# Patient Record
Sex: FEMALE | Race: WHITE | NOT HISPANIC OR LATINO | Employment: FULL TIME | URBAN - NONMETROPOLITAN AREA
[De-identification: names, ages, dates, MRNs, and addresses within clinical notes are randomized per-mention and may not be internally consistent; named-entity substitution may affect disease eponyms.]

---

## 2017-02-08 ENCOUNTER — OFFICE VISIT (OUTPATIENT)
Dept: PSYCHIATRY | Facility: CLINIC | Age: 27
End: 2017-02-08

## 2017-02-08 DIAGNOSIS — F41.0 PANIC ATTACK AS REACTION TO STRESS: Primary | ICD-10-CM

## 2017-02-08 DIAGNOSIS — F43.10 POST TRAUMATIC STRESS DISORDER (PTSD): ICD-10-CM

## 2017-02-08 DIAGNOSIS — F43.0 PANIC ATTACK AS REACTION TO STRESS: Primary | ICD-10-CM

## 2017-02-08 PROBLEM — F90.0 ADHD, PREDOMINANTLY INATTENTIVE TYPE: Status: ACTIVE | Noted: 2017-02-08

## 2017-02-08 PROCEDURE — 90837 PSYTX W PT 60 MINUTES: CPT | Performed by: COUNSELOR
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
## 2017-03-07 ENCOUNTER — OFFICE VISIT (OUTPATIENT)
Dept: PSYCHIATRY | Facility: CLINIC | Age: 27
End: 2017-03-07

## 2017-03-07 VITALS
HEIGHT: 63 IN | DIASTOLIC BLOOD PRESSURE: 58 MMHG | SYSTOLIC BLOOD PRESSURE: 101 MMHG | WEIGHT: 116 LBS | HEART RATE: 84 BPM | BODY MASS INDEX: 20.55 KG/M2

## 2017-03-07 DIAGNOSIS — R56.9 SEIZURES (HCC): ICD-10-CM

## 2017-03-07 DIAGNOSIS — F41.0 PANIC ATTACK AS REACTION TO STRESS: Primary | ICD-10-CM

## 2017-03-07 DIAGNOSIS — F43.0 PANIC ATTACK AS REACTION TO STRESS: Primary | ICD-10-CM

## 2017-03-07 DIAGNOSIS — F43.10 POST TRAUMATIC STRESS DISORDER (PTSD): ICD-10-CM

## 2017-03-07 PROCEDURE — 99214 OFFICE O/P EST MOD 30 MIN: CPT | Performed by: NURSE PRACTITIONER

## 2017-03-07 RX ORDER — GUANFACINE 1 MG/1
1 TABLET ORAL NIGHTLY
Qty: 30 TABLET | Refills: 0 | Status: SHIPPED | OUTPATIENT
Start: 2017-03-07 | End: 2017-03-21 | Stop reason: SDUPTHER

## 2017-03-07 RX ORDER — LAMOTRIGINE 150 MG/1
TABLET ORAL
Refills: 10 | COMMUNITY
Start: 2017-02-15 | End: 2017-03-21 | Stop reason: SDUPTHER

## 2017-03-07 RX ORDER — FOLIC ACID 1 MG/1
1 TABLET ORAL DAILY
Refills: 10 | COMMUNITY
Start: 2017-02-15

## 2017-03-07 NOTE — PROGRESS NOTES
Subjective   Basia Schulte is a 26 y.o. female who is here today for medication management follow up.    Chief Complaint:      HPI: History of Present Illness She has been referred for medication management.  Patient has difficulty with attention and focus-she is struggling with getting her work done for college.  Patient often feels annoyance with others and has difficulty listening to others.  She often is 'rude'.  The patient reports the following symptoms of anxiety: constant anxiety/worry, restlessness/on edge, difficulty concentrating, mind goes blank, irritability, muscle tension, sleep disturbance and anxiety causes distress/impairment in important areas of functioning and have caused impairment in important areas of functioning. The patient reports the following panic symptoms: palpitations/pounding heart, trembling, sensation of shortness of breath, paresthesias and derealization/depersonalization which have collectively caused impairment in important areas of functioning. Panic symptoms usually last about 2 minutes at a time. Panic attacks are reported approximately 3 times year.  She will get things on her mind and over worry-I will think about things over 10 hours day. She denies any current depressive symptoms, she reports anxiety is currently 'ok'.  Speech is rapid.  She has to do multiple thing at a time.   The patient has had difficulty in the past with ocd behaviors including everything being in it's place.  She gets bored easily.  She is having difficulty doing class work.  She is unable to study with children in the house.  She is so easily distracted that she can't complete test if any extraneousness noise.  She frequently need to learn while active, pacing.  She has multiple symptoms of ADHD including difficulty organizing task, inattention to detail, frequent interruption into others converstation/task, difficulty complying with verbal instruction, forgetfulness, and .   Caregiver and patient  "report medication compliance.  Patient is tolerating medications without reported side effects. Patient adamantly denies any thoughts to harm self or others. Patient/caregiver report adequate sleep and adequate nutrition.  Patient appears well developed and weight is stable.  She denies any difficulty with appetite or history of eating disorders.  She denies any current ptsd, psychotic symptoms.    Medication trials with lexapro -made her symptoms worse worsened panic.  She has seizures none in last several years.      The following portions of the patient's history were reviewed and updated as appropriate: allergies, current medications, past family history, past medical history, past social history, past surgical history and problem list.    Review of Systems  Patient denies any recent medical illnesses.  No acute cardiopulmonary symptoms evident.  No acute gastrointestinal complaints.  Patient's appetite and intake are adequate.  Patient's current weight compared with last weight.    Objective   Physical Exam  Blood pressure 101/58, pulse 84, height 63\" (160 cm), weight 116 lb (52.6 kg).    No Known Allergies    Current Medications:   Current Outpatient Prescriptions   Medication Sig Dispense Refill   • folic acid (FOLVITE) 1 MG tablet Daily.  10   • lamoTRIgine (LaMICtal) 100 MG tablet Take 150 mg by mouth 2 (Two) Times a Day.       No current facility-administered medications for this visit.        Mental Status Exam:   Hygiene:   fair  Cooperation:  Cooperative  Eye Contact:  Good  Psychomotor Behavior:  Appropriate  Affect:  Full range  Hopelessness: Denies  Speech:  Normal  Thought Process:  Goal directed and Linear  Thought Content:  Mood congurent  Suicidal:  None  Homicidal:  None  Hallucinations:  None  Delusion:  None  Memory:  Intact  Orientation:  Person, Place, Time and Situation  Reliability:  fair  Insight:  Fair  Judgement:  Fair  Impulse Control:  Fair  Physical/Medical Issues:  No "     Assessment/Plan   Diagnoses and all orders for this visit:    Panic attack as reaction to stress    Post traumatic stress disorder (PTSD)    Seizures    Other orders  -     guanFACINE (TENEX) 1 MG tablet; Take 1 tablet by mouth Every Night. Take 1/2 tablet at hs for 4 days then increase to 1/2 tablet twice day.     patient appears to have multiple symptoms of anxiety and ADHD.  It is difficult to ascertain and also does appear to be medical comorbidities including active seizures may be influenced by medication choice.  We'll attempt to get records from her neurologist further assess patient we will begin with Tenex for assistance with sleep concentration and anxiety.  We will continue to explore the need for other stimulant type medications.  Encouraged patient to consider her choice of career based on her ability to tolerate extended schoolwork.  We discussed risks, benefits, and side effects of the above medication and the patient was agreeable with the plan.    Return in about 2 weeks (around 3/21/2017).  The patient was instructed to call clinic as needed or go to ER if in crisis.  Patient was instructed to immediately call 911 or come to the nearest emergency room for thoughts to harm self or others.

## 2017-03-21 ENCOUNTER — OFFICE VISIT (OUTPATIENT)
Dept: PSYCHIATRY | Facility: CLINIC | Age: 27
End: 2017-03-21

## 2017-03-21 VITALS
BODY MASS INDEX: 20.73 KG/M2 | WEIGHT: 117 LBS | SYSTOLIC BLOOD PRESSURE: 98 MMHG | HEIGHT: 63 IN | DIASTOLIC BLOOD PRESSURE: 62 MMHG | HEART RATE: 92 BPM

## 2017-03-21 DIAGNOSIS — R56.9 SEIZURES (HCC): ICD-10-CM

## 2017-03-21 DIAGNOSIS — F41.0 PANIC ATTACK AS REACTION TO STRESS: ICD-10-CM

## 2017-03-21 DIAGNOSIS — F90.2 ADHD (ATTENTION DEFICIT HYPERACTIVITY DISORDER), COMBINED TYPE: Primary | ICD-10-CM

## 2017-03-21 DIAGNOSIS — F43.0 PANIC ATTACK AS REACTION TO STRESS: ICD-10-CM

## 2017-03-21 DIAGNOSIS — F43.10 POST TRAUMATIC STRESS DISORDER (PTSD): ICD-10-CM

## 2017-03-21 PROCEDURE — 99213 OFFICE O/P EST LOW 20 MIN: CPT | Performed by: NURSE PRACTITIONER

## 2017-03-21 RX ORDER — GUANFACINE 1 MG/1
1 TABLET ORAL NIGHTLY
Qty: 30 TABLET | Refills: 0 | Status: SHIPPED | OUTPATIENT
Start: 2017-03-21 | End: 2017-04-11 | Stop reason: SDUPTHER

## 2017-03-21 RX ORDER — METHYLPHENIDATE HYDROCHLORIDE 18 MG/1
18 TABLET ORAL DAILY
Qty: 30 TABLET | Refills: 0 | Status: SHIPPED | OUTPATIENT
Start: 2017-03-21 | End: 2017-04-11 | Stop reason: SDUPTHER

## 2017-03-21 NOTE — PROGRESS NOTES
"Subjective   Basia Schulte is a 26 y.o. female who is here today for medication management follow up.    Chief Complaint: That medicine helped a lot I'm not nearly as anxious    HPI: History of Present Illness patient presents today for follow-up after being started on Tenex for her significant attention problems.  The patient does report much improvement in her sleep she also reports improvement in her anxiety.  Patient continues to have great difficulty with attention and focus she is frequently losing things she frequently is unable to complete her assignments she continues to have to have the patience by her instructors.  Patient is reviewed on her previous records which demonstrates that she has a seizure disorder but it is well under control.  I previously consulted with my collaborating physician and ARNP that frequently treats attention deficit hyperactivity disorder regarding comorbid seizures patient was instructed at last visit that if necessary stimulants may increase her risk of seizures she is agreeable to trial.  Patient adamantly denies any mood symptoms she denies any thoughts to harm herself or others she denies any psychotic phenomenon she denies any other medical problems other than controlled seizures.    The following portions of the patient's history were reviewed and updated as appropriate: allergies, current medications, past family history, past medical history, past social history, past surgical history and problem list.    Review of Systems  Patient denies any recent medical illnesses.  No acute cardiopulmonary symptoms evident.  No acute gastrointestinal complaints.  Patient's appetite and intake are adequate.  Patient's current weight compared with last weight.    Objective   Physical Exam  Blood pressure 98/62, pulse 92, height 63\" (160 cm), weight 117 lb (53.1 kg).    No Known Allergies    Current Medications:   Current Outpatient Prescriptions   Medication Sig Dispense Refill   • " folic acid (FOLVITE) 1 MG tablet Daily.  10   • guanFACINE (TENEX) 1 MG tablet Take 1 tablet by mouth Every Night. Take 1/2 tablet at hs for 4 days then increase to 1/2 tablet twice day. 30 tablet 0   • lamoTRIgine (LaMICtal) 100 MG tablet Take 150 mg by mouth 2 (Two) Times a Day.     • methylphenidate (CONCERTA) 18 MG CR tablet Take 1 tablet by mouth Daily. 30 tablet 0     No current facility-administered medications for this visit.        Mental Status Exam:   Hygiene:   fair  Cooperation:  Cooperative  Eye Contact:  Good  Psychomotor Behavior:  Appropriate  Affect:  Full range  Hopelessness: Denies  Speech:  Normal  Thought Process:  Goal directed and Linear   Thought Content:  Normal and Mood congurent  Suicidal:  None  Homicidal:  None  Hallucinations:  None  Delusion:  None  Memory:  Intact  Orientation:  Person, Place, Time and Situation  Reliability:  fair  Insight:  Fair  Judgement:  Fair  Impulse Control:  Fair  Physical/Medical Issues:  No     Assessment/Plan   Diagnoses and all orders for this visit:    ADHD (attention deficit hyperactivity disorder), combined type    Panic attack as reaction to stress    Post traumatic stress disorder (PTSD)    Seizures    Other orders  -     methylphenidate (CONCERTA) 18 MG CR tablet; Take 1 tablet by mouth Daily.  -     guanFACINE (TENEX) 1 MG tablet; Take 1 tablet by mouth Every Night. Take 1/2 tablet at hs for 4 days then increase to 1/2 tablet twice day.        Patient had been currently fully assessed and does appear to meet his stimulants to assist her patient will be started on low-dose extended release Concerta we completed an extensive side effect education.  Patient was provided education regarding controlled substances contract was signed.  Patient consistently and adamantly denies any substance related problems.  We discussed risks, benefits, and side effects of the above medication and the patient was agreeable with the plan.    Return in about 4 weeks  (around 4/18/2017).  The patient was instructed to call clinic as needed or go to ER if in crisis.  Patient was instructed to immediately call 911 or come to the nearest emergency room for thoughts to harm self or others.

## 2017-04-11 ENCOUNTER — OFFICE VISIT (OUTPATIENT)
Dept: PSYCHIATRY | Facility: CLINIC | Age: 27
End: 2017-04-11

## 2017-04-11 VITALS
HEART RATE: 90 BPM | DIASTOLIC BLOOD PRESSURE: 60 MMHG | WEIGHT: 112 LBS | BODY MASS INDEX: 19.84 KG/M2 | HEIGHT: 63 IN | SYSTOLIC BLOOD PRESSURE: 93 MMHG

## 2017-04-11 DIAGNOSIS — F41.0 PANIC ATTACK AS REACTION TO STRESS: ICD-10-CM

## 2017-04-11 DIAGNOSIS — F43.0 PANIC ATTACK AS REACTION TO STRESS: ICD-10-CM

## 2017-04-11 DIAGNOSIS — F43.10 POST TRAUMATIC STRESS DISORDER (PTSD): ICD-10-CM

## 2017-04-11 DIAGNOSIS — F90.2 ADHD (ATTENTION DEFICIT HYPERACTIVITY DISORDER), COMBINED TYPE: Primary | ICD-10-CM

## 2017-04-11 DIAGNOSIS — R56.9 SEIZURES (HCC): ICD-10-CM

## 2017-04-11 PROCEDURE — 99213 OFFICE O/P EST LOW 20 MIN: CPT | Performed by: NURSE PRACTITIONER

## 2017-04-11 RX ORDER — METHYLPHENIDATE HYDROCHLORIDE 18 MG/1
18 TABLET ORAL DAILY
Qty: 30 TABLET | Refills: 0 | Status: SHIPPED | OUTPATIENT
Start: 2017-04-11 | End: 2017-08-01 | Stop reason: SDDI

## 2017-04-11 RX ORDER — GUANFACINE 1 MG/1
0.5 TABLET ORAL NIGHTLY
Qty: 30 TABLET | Refills: 0 | Status: SHIPPED | OUTPATIENT
Start: 2017-04-11 | End: 2017-08-01 | Stop reason: SDDI

## 2017-04-11 NOTE — PROGRESS NOTES
"Subjective   Basia Schulte is a 27 y.o. female who is here today for medication management follow up.    Chief Complaint: That medicine helped a lot I'm not nearly as anxious    HPI: History of Present Illness  The patient presents today for follow-up after being started on concerta and Tenex for her significant attention problems.  The patient does report much improvement in her sleep and also has been much improved at school.  \"I made an 89 on a test highest in the class.  She reports \"I'm so much better, I can't believe it\".  She also reports improvement in her anxiety.  Patient reports much improvement with attention and focus she has been able to complete her assignments.  She denies any mood symptoms, thoughts of self harm or suicide.  Patient is denying any  Seizures.  The patient was instructed on medication holidays.  Patient adamantly denies any mood symptoms she denies any thoughts to harm herself or others she denies any psychotic phenomenon.       The following portions of the patient's history were reviewed and updated as appropriate: allergies, current medications, past family history, past medical history, past social history, past surgical history and problem list.    Review of Systems  Patient denies any recent medical illnesses.  No acute cardiopulmonary symptoms evident.  No acute gastrointestinal complaints.  Patient's appetite and intake are adequate.  Patient's current weight compared with last weight.    Objective   Physical Exam  Blood pressure 93/60, pulse 90, height 63\" (160 cm), weight 112 lb (50.8 kg).    No Known Allergies    Current Medications:   Current Outpatient Prescriptions   Medication Sig Dispense Refill   • folic acid (FOLVITE) 1 MG tablet Daily.  10   • guanFACINE (TENEX) 1 MG tablet Take 1 tablet by mouth Every Night. Take 1/2 tablet at hs for 4 days then increase to 1/2 tablet twice day. 30 tablet 0   • lamoTRIgine (LaMICtal) 100 MG tablet Take 150 mg by mouth 2 (Two) Times " a Day.     • methylphenidate (CONCERTA) 18 MG CR tablet Take 1 tablet by mouth Daily. 30 tablet 0     No current facility-administered medications for this visit.        Mental Status Exam:   Hygiene:   fair  Cooperation:  Cooperative  Eye Contact:  Good  Psychomotor Behavior:  Appropriate  Affect:  Full range  Hopelessness: Denies  Speech:  Normal  Thought Process:  Goal directed and Linear   Thought Content:  Normal and Mood congurent  Suicidal:  None  Homicidal:  None  Hallucinations:  None  Delusion:  None  Memory:  Intact  Orientation:  Person, Place, Time and Situation  Reliability:  fair  Insight:  Fair  Judgement:  Fair  Impulse Control:  Fair  Physical/Medical Issues:  No     Assessment/Plan   Diagnoses and all orders for this visit:    ADHD (attention deficit hyperactivity disorder), combined type    Panic attack as reaction to stress    Post traumatic stress disorder (PTSD)    Seizures    Other orders  -     guanFACINE (TENEX) 1 MG tablet; Take 0.5 tablets by mouth Every Night.  -     methylphenidate (CONCERTA) 18 MG CR tablet; Take 1 tablet by mouth Daily.        Patient had been currently fully assessed and does appear to meet his stimulants to assist her patient will be started on low-dose extended release Concerta we completed an extensive side effect education.  Patient was provided education regarding controlled substances contract was signed.  Patient consistently and adamantly denies any substance related problems.  We discussed risks, benefits, and side effects of the above medication and the patient was agreeable with the plan.    Return in about 4 weeks (around 5/9/2017).  The patient was instructed to call clinic as needed or go to ER if in crisis.  Patient was instructed to immediately call 911 or come to the nearest emergency room for thoughts to harm self or others.

## 2017-08-01 ENCOUNTER — OFFICE VISIT (OUTPATIENT)
Dept: PSYCHIATRY | Facility: CLINIC | Age: 27
End: 2017-08-01

## 2017-08-01 VITALS
HEART RATE: 96 BPM | WEIGHT: 116 LBS | SYSTOLIC BLOOD PRESSURE: 99 MMHG | DIASTOLIC BLOOD PRESSURE: 71 MMHG | HEIGHT: 63 IN | BODY MASS INDEX: 20.55 KG/M2

## 2017-08-01 DIAGNOSIS — F43.0 PANIC ATTACK AS REACTION TO STRESS: ICD-10-CM

## 2017-08-01 DIAGNOSIS — F41.0 PANIC ATTACK AS REACTION TO STRESS: ICD-10-CM

## 2017-08-01 DIAGNOSIS — R56.9 SEIZURES (HCC): ICD-10-CM

## 2017-08-01 DIAGNOSIS — F90.2 ADHD (ATTENTION DEFICIT HYPERACTIVITY DISORDER), COMBINED TYPE: Primary | ICD-10-CM

## 2017-08-01 DIAGNOSIS — F43.10 POST TRAUMATIC STRESS DISORDER (PTSD): ICD-10-CM

## 2017-08-01 PROCEDURE — 99213 OFFICE O/P EST LOW 20 MIN: CPT | Performed by: PSYCHIATRY & NEUROLOGY

## 2017-08-01 RX ORDER — BROMPHENIRAMINE MALEATE, PSEUDOEPHEDRINE HYDROCHLORIDE, AND DEXTROMETHORPHAN HYDROBROMIDE 2; 30; 10 MG/5ML; MG/5ML; MG/5ML
SYRUP ORAL
Refills: 0 | COMMUNITY
Start: 2017-06-28 | End: 2017-08-01

## 2017-08-01 RX ORDER — METHYLPHENIDATE HYDROCHLORIDE 18 MG/1
18 TABLET ORAL DAILY
Qty: 30 TABLET | Refills: 0 | Status: SHIPPED | OUTPATIENT
Start: 2017-08-01 | End: 2017-08-29 | Stop reason: SDUPTHER

## 2017-08-01 RX ORDER — LAMOTRIGINE 150 MG/1
150 TABLET ORAL 2 TIMES DAILY
Refills: 10 | COMMUNITY
Start: 2017-07-14 | End: 2019-02-06 | Stop reason: SDUPTHER

## 2017-08-01 RX ORDER — GUANFACINE 1 MG/1
0.5 TABLET ORAL NIGHTLY
Qty: 30 TABLET | Refills: 0 | Status: SHIPPED | OUTPATIENT
Start: 2017-08-01 | End: 2017-08-29 | Stop reason: SDUPTHER

## 2017-08-01 NOTE — PROGRESS NOTES
"Subjective   Patient ID: Basia Schulte is a 27 y.o. female is here today for follow-up.     BP 99/71  Pulse 96  Ht 63\" (160 cm)  Wt 116 lb (52.6 kg)  BMI 20.55 kg/m2    Review of patient's allergies indicates no known allergies.    History of Present Illness The patient is seen in the clinic for a follow-up. She has been seeing Nancy Tipton for her ADHD treatment and has not been in the clinic for the last three months. She states that she was off for the summer and didn't take her medications but now she is starting school, she wants to resume her medication. She states that the medication helps her be less fidgety and she is able to focus better. She reports her mood to be good and denies any thoughts of harm to self or others.    The following portions of the patient's history were reviewed and updated as appropriate: allergies, current medications, past family history, past medical history, past social history, past surgical history and problem list.    PFSH:    Review of Systems   Constitutional: Negative.    HENT: Negative.    Eyes: Negative.    Respiratory: Negative.    Cardiovascular: Negative.    Gastrointestinal: Positive for nausea.       Objective   Mental Status Exam  Appearance:  clean and casually dressed, appropriate  Attitude toward clinician:  cooperative and agreeable   Speech:    Rate:  regular rate and rhythm   Volume:  normal  Motor:  no abnormal movements present  Mood:  Good  Affect:  euthymic  Thought Processes:  linear, logical, and goal directed  Thought Content:  normal  Suicidal Thoughts:  absent  Homicidal Thoughts:  absent  Perceptual Disturbance: no perceptual disturbance  Attention and Concentration:  good  Insight and Judgement:  good  Memory:  memory appears to be intact    MEDICATION ISSUES:  Current Outpatient Prescriptions on File Prior to Visit   Medication Sig Dispense Refill   • folic acid (FOLVITE) 1 MG tablet Daily.  10   • [DISCONTINUED] guanFACINE (TENEX) 1 MG tablet " Take 0.5 tablets by mouth Every Night. 30 tablet 0   • [DISCONTINUED] lamoTRIgine (LaMICtal) 100 MG tablet Take 150 mg by mouth 2 (Two) Times a Day.     • [DISCONTINUED] methylphenidate (CONCERTA) 18 MG CR tablet Take 1 tablet by mouth Daily. 30 tablet 0     No current facility-administered medications on file prior to visit.        Lab Review:   No visits with results within 2 Month(s) from this visit.  Latest known visit with results is:    Admission on 10/30/2016, Discharged on 10/30/2016   Component Date Value   • Glucose 10/30/2016 98    • BUN 10/30/2016 9    • Creatinine 10/30/2016 0.79    • Sodium 10/30/2016 141    • Potassium 10/30/2016 3.6    • Chloride 10/30/2016 111    • CO2 10/30/2016 29.3    • Calcium 10/30/2016 9.4    • Total Protein 10/30/2016 7.3    • Albumin 10/30/2016 4.40    • ALT (SGPT) 10/30/2016 10    • AST (SGOT) 10/30/2016 18    • Alkaline Phosphatase 10/30/2016 69    • Total Bilirubin 10/30/2016 0.5    • eGFR Non African Amer 10/30/2016 89    • Globulin 10/30/2016 2.9    • A/G Ratio 10/30/2016 1.5    • BUN/Creatinine Ratio 10/30/2016 11.4    • Anion Gap 10/30/2016 0.7*   • HCG, Urine QL 10/30/2016 Negative    • Color, UA 10/30/2016 Yellow    • Appearance, UA 10/30/2016 Clear    • pH, UA 10/30/2016 7.5    • Specific Gravity, UA 10/30/2016 1.007    • Glucose, UA 10/30/2016 Negative    • Ketones, UA 10/30/2016 Negative    • Bilirubin, UA 10/30/2016 Negative    • Blood, UA 10/30/2016 Trace*   • Protein, UA 10/30/2016 Negative    • Leuk Esterase, UA 10/30/2016 Trace*   • Nitrite, UA 10/30/2016 Negative    • Urobilinogen, UA 10/30/2016 0.2 E.U./dL    • WBC 10/30/2016 6.85    • RBC 10/30/2016 4.33    • Hemoglobin 10/30/2016 13.9    • Hematocrit 10/30/2016 41.6    • MCV 10/30/2016 96.1*   • MCH 10/30/2016 32.1    • MCHC 10/30/2016 33.4    • RDW 10/30/2016 12.2    • RDW-SD 10/30/2016 43.3    • MPV 10/30/2016 11.4*   • Platelets 10/30/2016 230    • Neutrophil % 10/30/2016 69.7    • Lymphocyte %  10/30/2016 21.9    • Monocyte % 10/30/2016 7.6    • Eosinophil % 10/30/2016 0.4    • Basophil % 10/30/2016 0.3    • Immature Grans % 10/30/2016 0.1    • Neutrophils, Absolute 10/30/2016 4.77    • Lymphocytes, Absolute 10/30/2016 1.50    • Monocytes, Absolute 10/30/2016 0.52    • Eosinophils, Absolute 10/30/2016 0.03    • Basophils, Absolute 10/30/2016 0.02    • Immature Grans, Absolute 10/30/2016 0.01    • Osmolality Calc 10/30/2016 280    • RBC, UA 10/30/2016 0-2*   • WBC, UA 10/30/2016 0-2*   • Bacteria, UA 10/30/2016 None Seen    • Squamous Epithelial Cell* 10/30/2016 0-2    • Hyaline Casts, UA 10/30/2016 None Seen    • Methodology 10/30/2016 Automated Microscopy      Assessment/Plan   Diagnoses and all orders for this visit:    ADHD (attention deficit hyperactivity disorder), combined type    Panic attack as reaction to stress    Post traumatic stress disorder (PTSD)    Seizures    Other orders  -     methylphenidate (CONCERTA) 18 MG CR tablet; Take 1 tablet by mouth Daily.  -     guanFACINE (TENEX) 1 MG tablet; Take 0.5 tablets by mouth Every Night.      Return in about 4 weeks (around 8/29/2017). The patient will resume her care with Nancy Tipton.

## 2017-08-06 PROBLEM — Z01.419 WELL WOMAN EXAM: Status: ACTIVE | Noted: 2017-08-06

## 2017-08-11 ENCOUNTER — OFFICE VISIT (OUTPATIENT)
Dept: OBSTETRICS AND GYNECOLOGY | Facility: CLINIC | Age: 27
End: 2017-08-11

## 2017-08-11 VITALS
SYSTOLIC BLOOD PRESSURE: 106 MMHG | BODY MASS INDEX: 20.91 KG/M2 | HEIGHT: 63 IN | DIASTOLIC BLOOD PRESSURE: 68 MMHG | RESPIRATION RATE: 14 BRPM | WEIGHT: 118 LBS

## 2017-08-11 DIAGNOSIS — Z01.419 WELL WOMAN EXAM: Primary | ICD-10-CM

## 2017-08-11 PROBLEM — F17.200 SMOKER: Status: ACTIVE | Noted: 2017-08-11

## 2017-08-11 PROCEDURE — 99395 PREV VISIT EST AGE 18-39: CPT | Performed by: OBSTETRICS & GYNECOLOGY

## 2017-08-11 RX ORDER — MEDROXYPROGESTERONE ACETATE 150 MG/ML
150 INJECTION, SUSPENSION INTRAMUSCULAR
Qty: 1 EACH | Refills: 4 | Status: SHIPPED | OUTPATIENT
Start: 2017-08-11 | End: 2018-06-14
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
## 2017-08-29 ENCOUNTER — OFFICE VISIT (OUTPATIENT)
Dept: PSYCHIATRY | Facility: CLINIC | Age: 27
End: 2017-08-29

## 2017-08-29 VITALS
DIASTOLIC BLOOD PRESSURE: 58 MMHG | WEIGHT: 117 LBS | SYSTOLIC BLOOD PRESSURE: 86 MMHG | HEIGHT: 63 IN | HEART RATE: 68 BPM | BODY MASS INDEX: 20.73 KG/M2

## 2017-08-29 DIAGNOSIS — F90.2 ADHD (ATTENTION DEFICIT HYPERACTIVITY DISORDER), COMBINED TYPE: Primary | ICD-10-CM

## 2017-08-29 DIAGNOSIS — R56.9 SEIZURES (HCC): ICD-10-CM

## 2017-08-29 PROCEDURE — 99213 OFFICE O/P EST LOW 20 MIN: CPT | Performed by: NURSE PRACTITIONER

## 2017-08-29 RX ORDER — METHYLPHENIDATE HYDROCHLORIDE 18 MG/1
18 TABLET ORAL DAILY
Qty: 30 TABLET | Refills: 0 | Status: SHIPPED | OUTPATIENT
Start: 2017-08-29 | End: 2017-10-02 | Stop reason: SDUPTHER

## 2017-08-29 RX ORDER — GUANFACINE 1 MG/1
0.5 TABLET ORAL NIGHTLY
Qty: 30 TABLET | Refills: 0 | Status: SHIPPED | OUTPATIENT
Start: 2017-08-29 | End: 2017-10-02 | Stop reason: SDUPTHER

## 2017-08-29 NOTE — PROGRESS NOTES
"Subjective   Basia Schulte is a 27 y.o. female who is here today for medication management follow up.    Chief Complaint:I've restarted on the medication for school.    HPI: History of Present Illness  The patient presents today for follow-up after being off concerta and Tenex for summer. The patient does report much improvement in her sleep and also has been much improved at school.  She did well with school last year.    She also reports improvement in her anxiety.  Patient reports much improvement with attention and focus she has been able to complete her assignments.  She denies any mood symptoms, thoughts of self harm or suicide.  Patient is denying any  Seizures.  Patient adamantly denies any mood symptoms she denies any thoughts to harm herself or others she denies any psychotic phenomenon.  She did report much worsening of distraction and attention over summer.     The following portions of the patient's history were reviewed and updated as appropriate: allergies, current medications, past family history, past medical history, past social history, past surgical history and problem list.    Review of Systems  Patient denies any recent medical illnesses.  No acute cardiopulmonary symptoms evident.  No acute gastrointestinal complaints.  Patient's appetite and intake are adequate.  Patient's current weight compared with last weight.    Objective   Physical Exam  Blood pressure (!) 86/58, pulse 68, height 63\" (160 cm), weight 117 lb (53.1 kg), last menstrual period 08/03/2017, not currently breastfeeding.    No Known Allergies    Current Medications:   Current Outpatient Prescriptions   Medication Sig Dispense Refill   • folic acid (FOLVITE) 1 MG tablet Daily.  10   • guanFACINE (TENEX) 1 MG tablet Take 0.5 tablets by mouth Every Night. 30 tablet 0   • lamoTRIgine (LaMICtal) 150 MG tablet 150 mg 2 (Two) Times a Day.  10   • medroxyPROGESTERone (DEPO-PROVERA) 150 MG/ML injection Inject 1 mL into the shoulder, " thigh, or buttocks Every 3 (Three) Months. 1 each 4   • methylphenidate (CONCERTA) 18 MG CR tablet Take 1 tablet by mouth Daily. 30 tablet 0     No current facility-administered medications for this visit.        Mental Status Exam:   Hygiene:   fair  Cooperation:  Cooperative  Eye Contact:  Good  Psychomotor Behavior:  Appropriate  Affect:  Full range  Hopelessness: Denies  Speech:  Normal  Thought Process:  Goal directed and Linear   Thought Content:  Normal and Mood congurent  Suicidal:  None  Homicidal:  None  Hallucinations:  None  Delusion:  None  Memory:  Intact  Orientation:  Person, Place, Time and Situation  Reliability:  fair  Insight:  Fair  Judgement:  Fair  Impulse Control:  Fair  Physical/Medical Issues:  No     Assessment/Plan   Diagnoses and all orders for this visit:    ADHD (attention deficit hyperactivity disorder), combined type    Seizures    Errors in dictation may reflect use of voice recognition software and not all errors in transcription may have been detected prior to signing.Patient is being prescribed a controlled substance as part of treatment plan. Patient has been educated of appropriate use of the medications, including risk of somnolence, limited ability to drive and/or work safely, and potential for dependence, respiratory depression and overdose. Patient is also informed that the medication are to be used by the patient only- avoid any combined use of ETOH or other substances unless prescribed.         Patient had been currently fully assessed and does appear to meet his stimulants to assist her patient will be started on low-dose extended release Concerta we completed an extensive side effect education.  Patient was provided education regarding controlled substances contract was signed.  Patient consistently and adamantly denies any substance related problems.  We discussed risks, benefits, and side effects of the above medication and the patient was agreeable with the  plan.    Return in about 4 weeks (around 9/26/2017).  The patient was instructed to call clinic as needed or go to ER if in crisis.  Patient was instructed to immediately call 911 or come to the nearest emergency room for thoughts to harm self or others.

## 2017-10-02 RX ORDER — METHYLPHENIDATE HYDROCHLORIDE 18 MG/1
18 TABLET ORAL DAILY
Qty: 30 TABLET | Refills: 0 | Status: SHIPPED | OUTPATIENT
Start: 2017-10-02 | End: 2017-11-02 | Stop reason: SDUPTHER

## 2017-10-02 RX ORDER — GUANFACINE 1 MG/1
0.5 TABLET ORAL NIGHTLY
Qty: 30 TABLET | Refills: 0 | Status: SHIPPED | OUTPATIENT
Start: 2017-10-02 | End: 2017-11-30 | Stop reason: SDUPTHER

## 2017-11-02 ENCOUNTER — OFFICE VISIT (OUTPATIENT)
Dept: PSYCHIATRY | Facility: CLINIC | Age: 27
End: 2017-11-02

## 2017-11-02 VITALS
WEIGHT: 118 LBS | DIASTOLIC BLOOD PRESSURE: 57 MMHG | SYSTOLIC BLOOD PRESSURE: 94 MMHG | HEART RATE: 85 BPM | BODY MASS INDEX: 20.91 KG/M2 | HEIGHT: 63 IN

## 2017-11-02 DIAGNOSIS — F41.0 PANIC ATTACK AS REACTION TO STRESS: ICD-10-CM

## 2017-11-02 DIAGNOSIS — F43.10 POST TRAUMATIC STRESS DISORDER (PTSD): ICD-10-CM

## 2017-11-02 DIAGNOSIS — F43.0 PANIC ATTACK AS REACTION TO STRESS: ICD-10-CM

## 2017-11-02 DIAGNOSIS — F90.2 ADHD (ATTENTION DEFICIT HYPERACTIVITY DISORDER), COMBINED TYPE: Primary | ICD-10-CM

## 2017-11-02 PROCEDURE — 99213 OFFICE O/P EST LOW 20 MIN: CPT | Performed by: NURSE PRACTITIONER

## 2017-11-02 RX ORDER — METHYLPHENIDATE HYDROCHLORIDE 18 MG/1
18 TABLET ORAL DAILY
Qty: 30 TABLET | Refills: 0 | Status: SHIPPED | OUTPATIENT
Start: 2017-11-02 | End: 2017-11-30 | Stop reason: SDUPTHER
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
## 2017-11-30 RX ORDER — GUANFACINE 1 MG/1
0.5 TABLET ORAL NIGHTLY
Qty: 30 TABLET | Refills: 0 | Status: SHIPPED | OUTPATIENT
Start: 2017-11-30 | End: 2018-01-29 | Stop reason: SDUPTHER

## 2017-11-30 RX ORDER — METHYLPHENIDATE HYDROCHLORIDE 18 MG/1
18 TABLET ORAL DAILY
Qty: 30 TABLET | Refills: 0 | Status: SHIPPED | OUTPATIENT
Start: 2017-11-30 | End: 2018-01-04 | Stop reason: SDUPTHER

## 2018-01-04 RX ORDER — METHYLPHENIDATE HYDROCHLORIDE 18 MG/1
18 TABLET ORAL DAILY
Qty: 30 TABLET | Refills: 0 | Status: SHIPPED | OUTPATIENT
Start: 2018-01-04 | End: 2018-01-29 | Stop reason: SDUPTHER

## 2018-01-29 ENCOUNTER — OFFICE VISIT (OUTPATIENT)
Dept: PSYCHIATRY | Facility: CLINIC | Age: 28
End: 2018-01-29

## 2018-01-29 VITALS
WEIGHT: 116 LBS | HEIGHT: 63 IN | DIASTOLIC BLOOD PRESSURE: 67 MMHG | HEART RATE: 68 BPM | BODY MASS INDEX: 20.55 KG/M2 | SYSTOLIC BLOOD PRESSURE: 98 MMHG

## 2018-01-29 DIAGNOSIS — F41.0 PANIC ATTACK AS REACTION TO STRESS: ICD-10-CM

## 2018-01-29 DIAGNOSIS — F90.2 ADHD (ATTENTION DEFICIT HYPERACTIVITY DISORDER), COMBINED TYPE: Primary | ICD-10-CM

## 2018-01-29 DIAGNOSIS — G40.909 SEIZURE DISORDER (HCC): ICD-10-CM

## 2018-01-29 DIAGNOSIS — Z79.899 MEDICATION MANAGEMENT: ICD-10-CM

## 2018-01-29 DIAGNOSIS — F43.0 PANIC ATTACK AS REACTION TO STRESS: ICD-10-CM

## 2018-01-29 DIAGNOSIS — F43.10 POST TRAUMATIC STRESS DISORDER (PTSD): ICD-10-CM

## 2018-01-29 LAB
AMPHETAMINE CUT-OFF: 1000
BENZODIAZIPINE CUT-OFF: 300
BUPRENORPHINE CUT-OFF: 10
COCAINE CUT-OFF: 300
EXTERNAL AMPHETAMINE SCREEN URINE: NEGATIVE
EXTERNAL BENZODIAZEPINE SCREEN URINE: NEGATIVE
EXTERNAL BUPRENORPHINE SCREEN URINE: NEGATIVE
EXTERNAL COCAINE SCREEN URINE: NEGATIVE
EXTERNAL MDMA: NEGATIVE
EXTERNAL METHADONE SCREEN URINE: NEGATIVE
EXTERNAL METHAMPHETAMINE SCREEN URINE: NEGATIVE
EXTERNAL OPIATES SCREEN URINE: NEGATIVE
EXTERNAL OXYCODONE SCREEN URINE: NEGATIVE
EXTERNAL THC SCREEN URINE: NEGATIVE
MDMA CUT-OFF: 500
METHADONE CUT-OFF: 300
METHAMPHETAMINE CUT-OFF: 1000
OPIATES CUT-OFF: 300
OXYCODONE CUT-OFF: 100
THC CUT-OFF: 50

## 2018-01-29 PROCEDURE — 99213 OFFICE O/P EST LOW 20 MIN: CPT | Performed by: NURSE PRACTITIONER

## 2018-01-29 RX ORDER — METHYLPHENIDATE HYDROCHLORIDE 18 MG/1
18 TABLET ORAL DAILY
Qty: 30 TABLET | Refills: 0 | Status: SHIPPED | OUTPATIENT
Start: 2018-01-29 | End: 2018-02-26 | Stop reason: SDUPTHER

## 2018-01-29 RX ORDER — GUANFACINE 1 MG/1
0.5 TABLET ORAL NIGHTLY
Qty: 30 TABLET | Refills: 0 | Status: SHIPPED | OUTPATIENT
Start: 2018-01-29 | End: 2018-02-26 | Stop reason: SDUPTHER

## 2018-01-29 NOTE — PROGRESS NOTES
"Subjective   Basia Schulte is a 27 y.o. female who is here today for medication management follow up.    Chief Complaint:I've restarted on the medication for school.    HPI: History of Present Illness  The patient presents today for follow-up after resuming concerta.    She also reports worsening of her anxiety.  Patient reports much improvement with attention and focus she has been able to complete her assignments.  She denies any mood symptoms, thoughts of self harm or suicide.  Patient is denying any  Seizures.  Patient adamantly denies any mood symptoms she denies any thoughts to harm herself or others she denies any psychotic phenomenon.    She did share that her  grandmother passed away who partially raised her she has been the executor of the 's has caused her a great deal of stress and concern.  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  She is also assumed custody of her niece she states that it has been extremely overwhelming.  She reports being very stressed.       The following portions of the patient's history were reviewed and updated as appropriate: allergies, current medications, past family history, past medical history, past social history, past surgical history and problem list.    Review of Systems  Patient denies any recent medical illnesses.  No acute cardiopulmonary symptoms evident.  No acute gastrointestinal complaints.  Patient's appetite and intake are adequate.  Patient's current weight compared with last weight.    Objective   Physical Exam  Blood pressure 98/67, pulse 68, height 160 cm (62.99\"), weight 52.6 kg (116 lb), not currently breastfeeding.    No Known Allergies    Current Medications:   Current Outpatient Prescriptions   Medication Sig Dispense Refill   • folic acid (FOLVITE) 1 MG tablet Daily.  10   • guanFACINE (TENEX) 1 MG tablet Take 0.5 tablets by mouth Every Night. 30 tablet 0   • lamoTRIgine (LaMICtal) 150 MG tablet " 150 mg 2 (Two) Times a Day.  10   • medroxyPROGESTERone (DEPO-PROVERA) 150 MG/ML injection Inject 1 mL into the shoulder, thigh, or buttocks Every 3 (Three) Months. 1 each 4   • methylphenidate (CONCERTA) 18 MG CR tablet Take 1 tablet by mouth Daily Earliest Fill Date: 1/4/18 30 tablet 0     No current facility-administered medications for this visit.        Mental Status Exam:   Hygiene:   fair  Cooperation:  Cooperative  Eye Contact:  Good  Psychomotor Behavior:  Appropriate  Affect:  Full range  Hopelessness: Denies  Speech:  Normal  Thought Process:  Goal directed and Linear   Thought Content:  Normal and Mood congurent  Suicidal:  None  Homicidal:  None  Hallucinations:  None  Delusion:  None  Memory:  Intact  Orientation:  Person, Place, Time and Situation  Reliability:  fair  Insight:  Fair  Judgement:  Fair  Impulse Control:  Fair  Physical/Medical Issues:  No     Assessment/Plan   Diagnoses and all orders for this visit:    ADHD (attention deficit hyperactivity disorder), combined type    Panic attack as reaction to stress    Post traumatic stress disorder (PTSD)    Seizure disorder    Medication management  -     [facility] Drug Screen    Other orders  -     guanFACINE (TENEX) 1 MG tablet; Take 0.5 tablets by mouth Every Night.  -     methylphenidate (CONCERTA) 18 MG CR tablet; Take 1 tablet by mouth Daily    Errors in dictation may reflect use of voice recognition software and not all errors in transcription may have been detected prior to signing.Patient is being prescribed a controlled substance as part of treatment plan. Patient has been educated of appropriate use of the medications, including risk of somnolence, limited ability to drive and/or work safely, and potential for dependence, respiratory depression and overdose. Patient is also informed that the medication are to be used by the patient only- avoid any combined use of ETOH or other substances unless p      Kyle report of past 12 months  reviewed with no new issues- . Patient reports taking medication as prescribed.  Patient denies any abuse/misuse of medication.  Patient denies any other substance use issues.  No apparent substance related issues.  Patient appropriate to continue with medication.  Reinforced risk of medication.  Patient had been currently fully assessed and does appear to meet his stimulants to assist her patient will be started on low-dose extended release Concerta we completed an extensive side effect education.  Patient was provided education regarding controlled substances contract was signed.  Patient consistently and adamantly denies any substance related problems.  We discussed risks, benefits, and side effects of the above medication and the patient was agreeable with the plan.    Return in about 4 weeks (around 2/26/2018).  The patient was instructed to call clinic as needed or go to ER if in crisis.  Patient was instructed to immediately call 911 or come to the nearest emergency room for thoughts to harm self or others.

## 2018-02-10 ENCOUNTER — HOSPITAL ENCOUNTER (EMERGENCY)
Facility: HOSPITAL | Age: 28
Discharge: HOME | End: 2018-02-10
Attending: EMERGENCY MEDICINE | Admitting: EMERGENCY MEDICINE

## 2018-02-10 VITALS
SYSTOLIC BLOOD PRESSURE: 104 MMHG | HEIGHT: 63 IN | DIASTOLIC BLOOD PRESSURE: 75 MMHG | WEIGHT: 118 LBS | RESPIRATION RATE: 19 BRPM | TEMPERATURE: 98.4 F | BODY MASS INDEX: 20.91 KG/M2 | HEART RATE: 75 BPM | OXYGEN SATURATION: 99 %

## 2018-02-10 DIAGNOSIS — L02.31 LEFT BUTTOCK ABSCESS: Primary | ICD-10-CM

## 2018-02-10 DIAGNOSIS — K64.8 OTHER HEMORRHOIDS: ICD-10-CM

## 2018-02-10 PROCEDURE — 99283 EMERGENCY DEPT VISIT LOW MDM: CPT

## 2018-02-10 RX ORDER — DOXYCYCLINE 100 MG/1
100 CAPSULE ORAL 2 TIMES DAILY
Qty: 20 CAPSULE | Refills: 0 | Status: SHIPPED | OUTPATIENT
Start: 2018-02-10 | End: 2018-02-20

## 2018-02-10 RX ORDER — HYDROCORTISONE ACETATE 25 MG/1
25 SUPPOSITORY RECTAL ONCE
Status: COMPLETED | OUTPATIENT
Start: 2018-02-10 | End: 2018-02-10

## 2018-02-10 RX ADMIN — HYDROCORTISONE ACETATE 25 MG: 25 SUPPOSITORY RECTAL at 19:21

## 2018-02-10 NOTE — ED PROVIDER NOTES
Subjective   HPI Comments: Pt also states she has hemorrhoids that have been causing her increased pain. Denies any rectal bleeding.     Patient is a 27 y.o. female presenting with abscess.   History provided by:  Patient   used: No    Abscess   Location:  Pelvis  Pelvic abscess location:  L buttock  Abscess quality: painful and redness    Red streaking: no    Duration:  10 days  Progression:  Unchanged  Pain details:     Quality:  Dull and pressure    Severity:  Moderate    Duration:  10 days    Timing:  Constant    Progression:  Unchanged  Chronicity:  New  Relieved by:  Nothing  Ineffective treatments: has taken some bactrim pills without relief.  Associated symptoms: no fever, no headaches, no nausea and no vomiting    Risk factors: prior abscess    Risk factors: no hx of MRSA        Review of Systems   Constitutional: Negative for activity change and fever.   HENT: Negative for congestion, ear pain and sore throat.    Eyes: Negative for pain.   Respiratory: Negative for cough, shortness of breath and wheezing.    Cardiovascular: Negative for chest pain.   Gastrointestinal: Negative for abdominal distention, diarrhea, nausea and vomiting.   Genitourinary: Negative for difficulty urinating and dysuria.   Musculoskeletal: Negative for arthralgias and myalgias.   Skin: Negative for rash and wound.   Neurological: Negative for dizziness and headaches.   Psychiatric/Behavioral: Negative for agitation.   All other systems reviewed and are negative.      Past Medical History:   Diagnosis Date   • ADD (attention deficit disorder) 2016    Orignally Dx as anxiety as kid   • ADHD (attention deficit hyperactivity disorder)    • History of kidney stones 2009   • PID (pelvic inflammatory disease) 2016    Tx after seen in ER - STD w/u was (-)   • Seizure disorder 2002   • Seizures        No Known Allergies    Past Surgical History:   Procedure Laterality Date   • CERVICAL CERCLAGE  2011   • CERVICAL  CERCLAGE  04/2014   • D&C WITH SUCTION  2008   • D&C WITH SUCTION  08/2011   • EXTRACORPOREAL SHOCK WAVE LITHOTRIPSY (ESWL)  2009       Family History   Problem Relation Age of Onset   • Breast cancer Paternal Grandmother    • Ovarian cancer Maternal Aunt    • Anxiety disorder Mother    • Depression Mother    • Anxiety disorder Father    • Depression Father        Social History     Social History   • Marital status:      Spouse name: N/A   • Number of children: N/A   • Years of education: N/A     Social History Main Topics   • Smoking status: Current Every Day Smoker     Packs/day: 1.00     Years: 10.00     Types: Cigarettes     Start date: 2003   • Smokeless tobacco: Never Used   • Alcohol use No   • Drug use: No   • Sexual activity: Not Asked     Other Topics Concern   • None     Social History Narrative           Objective   Physical Exam   Constitutional: She is oriented to person, place, and time. She appears well-developed and well-nourished.   HENT:   Head: Normocephalic and atraumatic.   Eyes: EOM are normal. Pupils are equal, round, and reactive to light.   Neck: Normal range of motion. Neck supple.   Cardiovascular: Normal rate, regular rhythm and normal heart sounds.    Pulmonary/Chest: Effort normal and breath sounds normal.   Abdominal: Soft. Bowel sounds are normal.   Musculoskeletal: Normal range of motion.   Neurological: She is alert and oriented to person, place, and time.   Skin: Skin is warm and dry.        Psychiatric: She has a normal mood and affect. Her behavior is normal. Judgment and thought content normal.   Nursing note and vitals reviewed.      Procedures         ED Course  ED Course                  MDM  Number of Diagnoses or Management Options  Left buttock abscess:   Other hemorrhoids:      Amount and/or Complexity of Data Reviewed  Clinical lab tests: ordered and reviewed  Tests in the radiology section of CPT®: reviewed and ordered  Tests in the medicine section of CPT®:  reviewed and ordered    Patient Progress  Patient progress: stable      Final diagnoses:   Left buttock abscess   Other hemorrhoids            SAMIA Vargas  02/11/18 0010

## 2018-02-14 ENCOUNTER — TELEPHONE (OUTPATIENT)
Dept: SURGERY | Facility: CLINIC | Age: 28
End: 2018-02-14

## 2018-02-14 RX ORDER — FAMOTIDINE 20 MG
TABLET ORAL
COMMUNITY
End: 2018-02-15

## 2018-02-14 NOTE — TELEPHONE ENCOUNTER
Patient called yesterday and told me she was told to see a surgeon for her Abscess. She said it is getting bigger and the medications are  Not helping. I offered her to be worked in that day in the [place] office but she could not go so she wanted to come in on thursday

## 2018-02-15 ENCOUNTER — APPOINTMENT (OUTPATIENT)
Dept: PREADMISSION TESTING | Facility: HOSPITAL | Age: 28
End: 2018-02-15

## 2018-02-15 ENCOUNTER — OFFICE VISIT (OUTPATIENT)
Dept: SURGERY | Facility: CLINIC | Age: 28
End: 2018-02-15

## 2018-02-15 VITALS
DIASTOLIC BLOOD PRESSURE: 64 MMHG | HEIGHT: 63 IN | TEMPERATURE: 98.2 F | RESPIRATION RATE: 17 BRPM | HEART RATE: 73 BPM | WEIGHT: 118 LBS | BODY MASS INDEX: 20.91 KG/M2 | SYSTOLIC BLOOD PRESSURE: 107 MMHG

## 2018-02-15 DIAGNOSIS — N90.89 PERINEAL MASS IN FEMALE: Primary | ICD-10-CM

## 2018-02-15 LAB
DEPRECATED RDW RBC AUTO: 39.9 FL (ref 37–54)
ERYTHROCYTE [DISTWIDTH] IN BLOOD BY AUTOMATED COUNT: 11.5 % (ref 11.5–14.5)
HCT VFR BLD AUTO: 39.1 % (ref 37–47)
HGB BLD-MCNC: 12.9 G/DL (ref 12–16)
MCH RBC QN AUTO: 32 PG (ref 27–33)
MCHC RBC AUTO-ENTMCNC: 33 G/DL (ref 33–37)
MCV RBC AUTO: 97 FL (ref 80–94)
PLATELET # BLD AUTO: 231 10*3/MM3 (ref 130–400)
PMV BLD AUTO: 11.6 FL (ref 6–10)
RBC # BLD AUTO: 4.03 10*6/MM3 (ref 4.2–5.4)
WBC NRBC COR # BLD: 4.89 10*3/MM3 (ref 4.5–12.5)

## 2018-02-15 PROCEDURE — 36415 COLL VENOUS BLD VENIPUNCTURE: CPT

## 2018-02-15 PROCEDURE — 85027 COMPLETE CBC AUTOMATED: CPT | Performed by: SURGERY

## 2018-02-15 PROCEDURE — 99203 OFFICE O/P NEW LOW 30 MIN: CPT | Performed by: SURGERY

## 2018-02-15 NOTE — DISCHARGE INSTRUCTIONS
TAKE the following medications the morning of surgery:  All heart or blood pressure medications    Please discontinue all blood thinners and anticoagulants (except aspirin) prior to surgery as per your surgeon and cardiologist instructions.  Aspirin may be continued up to the day prior to surgery.    HOLD all diabetic medications the morning of surgery as order by physician.    Arrival time for surgery on 2/16/2018 is 0730 am    General Instructions:  • Do NOT eat or drink after midnight 2/15/2018 which includes water, mints, or gum.  • You may brush your teeth. Dental appliances that are removable must be taken out day of surgery.  • Do NOT smoke, chew tobacco, or drink alcohol within 24 hours prior to surgery.  • Bring medications in original bottles, any inhalers and if applicable your C-PAP/BI-PAP machine  • Bring any papers given to you in the doctor’s office  • Wear clean, comfortable clothes and socks  • Do NOT wear contact lenses or make-up or dark nail polish.  Bring a case for your glasses if applicable.  • Bring crutches or walker if applicable  • Leave all other valuables and jewelry at home  • If you were given a blood bank armband, continue to wear it until discharged.    Preventing a Surgical Site Infection:  • Shower on the morning of surgery using a fresh bar of anti-bacterial soap (such as Dial) and clean washcloth.  Dry with a clean towel and dress in clean clothing.  • For 2 to 3 days before surgery, avoid shaving with a razor near where you will have surgery because the razor can irritate skin and make it easier to develop an infection.  Ask your surgeon if you will be receiving antibiotics prior to surgery.  • Make sure you, your family, and all healthcare providers clean their hands with soap and water or an alcohol-based hand  before caring for you or your wound.  • If at all possible, quit smoking as many days before surgery as you can.    Day of Surgery:  Upon arrival, a pre-op  nurse and anesthesiologist will review your health history, obtain vital signs, and answer questions you may have.  The only belongings needed at this time will be your home medications and if applicable you C-PAP/BI-PAP machine.  If you are staying overnight, your family can leave the rest of your belongings in the car and bring them to your room later.  A pre-op nurse will start an IV and you may receive medication in preparation for surgery.  Due to patient privacy and limited space, only one member of your family will be able to accompany you in the pre-op area.  While you are in surgery your family should notify the waiting room  if they leave the waiting room area and provide a contact number.  Please be aware that surgery does come with discomfort.  We want to make every effort to control your discomfort so please discuss any uncontrolled symptoms with your nurse.  Your doctor will most likely have prescribed pain medications.  If you are going home after surgery you will receive individualized written care instructions before being discharged.  A responsible adult must drive you to and from the hospital on the day of surgery and stay with you for 24 hours.  If you are staying overnight following surgery, you will be transported to your hospital room following the recovery period.

## 2018-02-15 NOTE — PROGRESS NOTES
2/15/2018    Patient Information  Basia Schulte  [address]  [facility] [address]  1990  [phone] (home)     Chief Complaint   Patient presents with   • Abscess     Perineal Abscess       HPI  Patient is a 27-year-old white female who has a tender lesion in the left perineal area.  Started 3 weeks ago.  She developed 2 courses of antibiotics and is not getting better.  She's had go to the emergency room because is hurting so bad.    Review of Systems:  The Past medical history, family history, social history, medication list, allergies, and Review of Systems has been reviewed and confirmed.    General: negative  Integumentary: lump and abscess   Eyes: glasses  ENT: negative  Respiratory: negative  Gastrointestinal: heartburn and blood in stool  Cardiovascular: negative  Neurological: negative  Psychiatric: anxiety  Hematologic/Lymphatic: negative  Genitourinary: negative  Musculoskeletal: negative  Endocrine: negative  Breasts: negative      Patient Active Problem List   Diagnosis   • Annual GYN exam w/o problems   • ADD (attention deficit disorder)   • Seizure disorder   • Smoker         Past Medical History:   Diagnosis Date   • ADD (attention deficit disorder) 2016    Orignally Dx as anxiety as kid   • ADHD (attention deficit hyperactivity disorder)    • History of kidney stones 2009   • PID (pelvic inflammatory disease) 2016    Tx after seen in ER - STD w/u was (-)   • Seizure disorder 2002   • Seizures    • Vitamin D deficiency          Past Surgical History:   Procedure Laterality Date   • CERVICAL CERCLAGE  2011   • CERVICAL CERCLAGE  04/2014   • D&C WITH SUCTION  2008   • D&C WITH SUCTION  08/2011   • EXTRACORPOREAL SHOCK WAVE LITHOTRIPSY (ESWL)  2009         Family History   Problem Relation Age of Onset   • Breast cancer Paternal Grandmother    • Ovarian cancer Maternal Aunt    • Anxiety disorder Mother    • Depression Mother    • Anxiety disorder Father    • Depression Father          Social  "History   Substance Use Topics   • Smoking status: Current Every Day Smoker     Packs/day: 1.00     Years: 10.00     Types: Cigarettes     Start date: 2003   • Smokeless tobacco: Never Used   • Alcohol use No       Current Outpatient Prescriptions   Medication Sig Dispense Refill   • doxycycline (MONODOX) 100 MG capsule Take 1 capsule by mouth 2 (Two) Times a Day for 10 days. 20 capsule 0   • folic acid (FOLVITE) 1 MG tablet Daily.  10   • guanFACINE (TENEX) 1 MG tablet Take 0.5 tablets by mouth Every Night. 30 tablet 0   • hydrocortisone (ANUSOL-HC) 2.5 % rectal cream Insert  into the rectum 2 (Two) Times a Day. 28.35 g 0   • lamoTRIgine (LaMICtal) 150 MG tablet 150 mg 2 (Two) Times a Day.  10   • medroxyPROGESTERone (DEPO-PROVERA) 150 MG/ML injection Inject 1 mL into the shoulder, thigh, or buttocks Every 3 (Three) Months. 1 each 4   • methylphenidate (CONCERTA) 18 MG CR tablet Take 1 tablet by mouth Daily 30 tablet 0   • Sulfamethoxazole-Trimethoprim (BACTRIM PO) Take  by mouth.     • Vitamin D, Cholecalciferol, 1000 units capsule Take  by mouth.       No current facility-administered medications for this visit.          Allergies  Review of patient's allergies indicates no known allergies.    /64  Pulse 73  Temp 98.2 °F (36.8 °C)  Resp 17  Ht 160 cm (63\")  Wt 53.5 kg (118 lb)  BMI 20.9 kg/m2     Physical Exam   Constitutional: She is oriented to person, place, and time. She appears well-developed and well-nourished. No distress.   HENT:   Head: Normocephalic.   Right Ear: External ear normal.   Left Ear: External ear normal.   Nose: Nose normal.   Mouth/Throat: Oropharynx is clear and moist.   Eyes: Conjunctivae and EOM are normal. Right eye exhibits no discharge. Left eye exhibits no discharge.   Neck: Normal range of motion. No JVD present. No tracheal deviation present. No thyromegaly present.   Cardiovascular: Normal rate, regular rhythm, normal heart sounds and intact distal pulses.  Exam " reveals no gallop and no friction rub.    No murmur heard.  Pulmonary/Chest: Effort normal and breath sounds normal. No stridor. No respiratory distress. She has no wheezes. She has no rales. She exhibits no tenderness.   Abdominal: Soft. Bowel sounds are normal. She exhibits no distension and no mass. There is no tenderness. There is no rebound and no guarding. No hernia.   Genitourinary: Rectal exam shows guaiac negative stool.   Musculoskeletal: Normal range of motion. She exhibits no edema, tenderness or deformity.   Lymphadenopathy:     She has no cervical adenopathy.   Neurological: She is alert and oriented to person, place, and time. She has normal reflexes. She displays normal reflexes. No cranial nerve deficit. She exhibits normal muscle tone. Coordination normal.   Skin: Skin is warm and dry. No rash noted. She is not diaphoretic. No erythema. No pallor.   Left perineal area at the 1 o'clock position in relation to the rectum there is a 1 cm subcutaneous mass which is tender   Psychiatric: She has a normal mood and affect. Her behavior is normal. Judgment and thought content normal.   Nursing note and vitals reviewed.                ASSESSMENT  Perineal mass        PLAN    Excised in the OR.          This document signed by Jonh Davis MD February 15, 2018 10:54 AM

## 2018-02-16 ENCOUNTER — HOSPITAL ENCOUNTER (OUTPATIENT)
Facility: HOSPITAL | Age: 28
Setting detail: HOSPITAL OUTPATIENT SURGERY
Discharge: HOME | End: 2018-02-16
Attending: SURGERY | Admitting: SURGERY

## 2018-02-16 ENCOUNTER — ANESTHESIA EVENT (OUTPATIENT)
Dept: PERIOP | Facility: HOSPITAL | Age: 28
End: 2018-02-16

## 2018-02-16 ENCOUNTER — ANESTHESIA (OUTPATIENT)
Dept: PERIOP | Facility: HOSPITAL | Age: 28
End: 2018-02-16

## 2018-02-16 VITALS
HEIGHT: 63 IN | SYSTOLIC BLOOD PRESSURE: 98 MMHG | DIASTOLIC BLOOD PRESSURE: 62 MMHG | RESPIRATION RATE: 14 BRPM | TEMPERATURE: 97.4 F | HEART RATE: 58 BPM | WEIGHT: 118 LBS | OXYGEN SATURATION: 97 % | BODY MASS INDEX: 20.91 KG/M2

## 2018-02-16 DIAGNOSIS — N90.89 PERINEAL MASS IN FEMALE: ICD-10-CM

## 2018-02-16 LAB
B-HCG UR QL: NEGATIVE
INTERNAL NEGATIVE CONTROL: NEGATIVE
INTERNAL POSITIVE CONTROL: POSITIVE
Lab: NORMAL

## 2018-02-16 PROCEDURE — 12031 INTMD RPR S/A/T/EXT 2.5 CM/<: CPT | Performed by: SURGERY

## 2018-02-16 PROCEDURE — 25010000002 ONDANSETRON PER 1 MG: Performed by: NURSE ANESTHETIST, CERTIFIED REGISTERED

## 2018-02-16 PROCEDURE — 25010000002 PROPOFOL 10 MG/ML EMULSION: Performed by: NURSE ANESTHETIST, CERTIFIED REGISTERED

## 2018-02-16 PROCEDURE — 25010000002 PROPOFOL 1000 MG/ML EMULSION: Performed by: NURSE ANESTHETIST, CERTIFIED REGISTERED

## 2018-02-16 PROCEDURE — 25010000002 FENTANYL CITRATE (PF) 100 MCG/2ML SOLUTION: Performed by: NURSE ANESTHETIST, CERTIFIED REGISTERED

## 2018-02-16 PROCEDURE — 25010000002 MIDAZOLAM PER 1 MG: Performed by: NURSE ANESTHETIST, CERTIFIED REGISTERED

## 2018-02-16 PROCEDURE — 11401 EXC TR-EXT B9+MARG 0.6-1 CM: CPT | Performed by: SURGERY

## 2018-02-16 PROCEDURE — 25010000002 VANCOMYCIN PER 500 MG: Performed by: SURGERY

## 2018-02-16 RX ORDER — FENTANYL CITRATE 50 UG/ML
50 INJECTION, SOLUTION INTRAMUSCULAR; INTRAVENOUS
Status: DISCONTINUED | OUTPATIENT
Start: 2018-02-16 | End: 2018-02-16 | Stop reason: HOSPADM

## 2018-02-16 RX ORDER — BUPIVACAINE HYDROCHLORIDE 2.5 MG/ML
INJECTION, SOLUTION EPIDURAL; INFILTRATION; INTRACAUDAL AS NEEDED
Status: DISCONTINUED | OUTPATIENT
Start: 2018-02-16 | End: 2018-02-16 | Stop reason: HOSPADM

## 2018-02-16 RX ORDER — BACITRACIN, NEOMYCIN, POLYMYXIN B 400; 3.5; 5 [USP'U]/G; MG/G; [USP'U]/G
OINTMENT TOPICAL AS NEEDED
Status: DISCONTINUED | OUTPATIENT
Start: 2018-02-16 | End: 2018-02-16 | Stop reason: HOSPADM

## 2018-02-16 RX ORDER — OXYCODONE HYDROCHLORIDE AND ACETAMINOPHEN 5; 325 MG/1; MG/1
1 TABLET ORAL ONCE AS NEEDED
Status: DISCONTINUED | OUTPATIENT
Start: 2018-02-16 | End: 2018-02-16 | Stop reason: HOSPADM

## 2018-02-16 RX ORDER — MEPERIDINE HYDROCHLORIDE 25 MG/ML
12.5 INJECTION INTRAMUSCULAR; INTRAVENOUS; SUBCUTANEOUS
Status: DISCONTINUED | OUTPATIENT
Start: 2018-02-16 | End: 2018-02-16 | Stop reason: HOSPADM

## 2018-02-16 RX ORDER — MAGNESIUM HYDROXIDE 1200 MG/15ML
LIQUID ORAL AS NEEDED
Status: DISCONTINUED | OUTPATIENT
Start: 2018-02-16 | End: 2018-02-16 | Stop reason: HOSPADM

## 2018-02-16 RX ORDER — ONDANSETRON 2 MG/ML
INJECTION INTRAMUSCULAR; INTRAVENOUS AS NEEDED
Status: DISCONTINUED | OUTPATIENT
Start: 2018-02-16 | End: 2018-02-16 | Stop reason: SURG

## 2018-02-16 RX ORDER — FENTANYL CITRATE 50 UG/ML
INJECTION, SOLUTION INTRAMUSCULAR; INTRAVENOUS AS NEEDED
Status: DISCONTINUED | OUTPATIENT
Start: 2018-02-16 | End: 2018-02-16 | Stop reason: SURG

## 2018-02-16 RX ORDER — SODIUM CHLORIDE 0.9 % (FLUSH) 0.9 %
1-10 SYRINGE (ML) INJECTION AS NEEDED
Status: DISCONTINUED | OUTPATIENT
Start: 2018-02-16 | End: 2018-02-16 | Stop reason: HOSPADM

## 2018-02-16 RX ORDER — ONDANSETRON 2 MG/ML
4 INJECTION INTRAMUSCULAR; INTRAVENOUS ONCE AS NEEDED
Status: DISCONTINUED | OUTPATIENT
Start: 2018-02-16 | End: 2018-02-16 | Stop reason: HOSPADM

## 2018-02-16 RX ORDER — FLUCONAZOLE 200 MG/1
200 TABLET ORAL DAILY
Qty: 6 TABLET | Refills: 0 | Status: SHIPPED | OUTPATIENT
Start: 2018-02-16 | End: 2018-02-22

## 2018-02-16 RX ORDER — FAMOTIDINE 10 MG/ML
INJECTION, SOLUTION INTRAVENOUS AS NEEDED
Status: DISCONTINUED | OUTPATIENT
Start: 2018-02-16 | End: 2018-02-16 | Stop reason: SURG

## 2018-02-16 RX ORDER — MIDAZOLAM HYDROCHLORIDE 1 MG/ML
INJECTION INTRAMUSCULAR; INTRAVENOUS AS NEEDED
Status: DISCONTINUED | OUTPATIENT
Start: 2018-02-16 | End: 2018-02-16 | Stop reason: SURG

## 2018-02-16 RX ORDER — OXYCODONE HYDROCHLORIDE AND ACETAMINOPHEN 5; 325 MG/1; MG/1
1-2 TABLET ORAL EVERY 4 HOURS PRN
Qty: 30 TABLET | Refills: 0 | Status: SHIPPED | OUTPATIENT
Start: 2018-02-16 | End: 2018-02-26

## 2018-02-16 RX ORDER — ONDANSETRON 4 MG/1
4 TABLET, FILM COATED ORAL DAILY PRN
Qty: 30 TABLET | Refills: 1 | Status: SHIPPED | OUTPATIENT
Start: 2018-02-16 | End: 2018-02-26

## 2018-02-16 RX ORDER — PROPOFOL 10 MG/ML
VIAL (ML) INTRAVENOUS AS NEEDED
Status: DISCONTINUED | OUTPATIENT
Start: 2018-02-16 | End: 2018-02-16 | Stop reason: SURG

## 2018-02-16 RX ORDER — IPRATROPIUM BROMIDE AND ALBUTEROL SULFATE 2.5; .5 MG/3ML; MG/3ML
3 SOLUTION RESPIRATORY (INHALATION) ONCE AS NEEDED
Status: DISCONTINUED | OUTPATIENT
Start: 2018-02-16 | End: 2018-02-16 | Stop reason: HOSPADM

## 2018-02-16 RX ORDER — SODIUM CHLORIDE, SODIUM LACTATE, POTASSIUM CHLORIDE, CALCIUM CHLORIDE 600; 310; 30; 20 MG/100ML; MG/100ML; MG/100ML; MG/100ML
125 INJECTION, SOLUTION INTRAVENOUS CONTINUOUS
Status: DISCONTINUED | OUTPATIENT
Start: 2018-02-16 | End: 2018-02-16 | Stop reason: HOSPADM

## 2018-02-16 RX ADMIN — ONDANSETRON 4 MG: 2 INJECTION, SOLUTION INTRAMUSCULAR; INTRAVENOUS at 08:56

## 2018-02-16 RX ADMIN — FENTANYL CITRATE 50 MCG: 50 INJECTION INTRAMUSCULAR; INTRAVENOUS at 09:52

## 2018-02-16 RX ADMIN — SODIUM CHLORIDE, POTASSIUM CHLORIDE, SODIUM LACTATE AND CALCIUM CHLORIDE: 600; 310; 30; 20 INJECTION, SOLUTION INTRAVENOUS at 08:56

## 2018-02-16 RX ADMIN — OXYCODONE HYDROCHLORIDE AND ACETAMINOPHEN 1 TABLET: 5; 325 TABLET ORAL at 10:49

## 2018-02-16 RX ADMIN — MIDAZOLAM HYDROCHLORIDE 2 MG: 1 INJECTION, SOLUTION INTRAMUSCULAR; INTRAVENOUS at 08:56

## 2018-02-16 RX ADMIN — SODIUM CHLORIDE, POTASSIUM CHLORIDE, SODIUM LACTATE AND CALCIUM CHLORIDE 125 ML/HR: 600; 310; 30; 20 INJECTION, SOLUTION INTRAVENOUS at 08:15

## 2018-02-16 RX ADMIN — FENTANYL CITRATE 100 MCG: 50 INJECTION INTRAMUSCULAR; INTRAVENOUS at 08:56

## 2018-02-16 RX ADMIN — FAMOTIDINE 20 MG: 10 INJECTION, SOLUTION INTRAVENOUS at 08:56

## 2018-02-16 RX ADMIN — VANCOMYCIN HYDROCHLORIDE 750 MG: 5 INJECTION, POWDER, LYOPHILIZED, FOR SOLUTION INTRAVENOUS at 08:56

## 2018-02-16 RX ADMIN — PROPOFOL 60 MG: 10 INJECTION, EMULSION INTRAVENOUS at 09:02

## 2018-02-16 RX ADMIN — PROPOFOL 120 MCG/KG/MIN: 10 INJECTION, EMULSION INTRAVENOUS at 09:02

## 2018-02-16 RX ADMIN — FENTANYL CITRATE 50 MCG: 50 INJECTION INTRAMUSCULAR; INTRAVENOUS at 10:00

## 2018-02-16 NOTE — OP NOTE
Basia Schulte  2/16/2018      Operative Progress Note:    Surgeon and Assistant: Dr. Davis    Pre-Operative Diagnosis: perineal mass    Post-Operative Diagnosis: perineal mass    Procedure(s):  excision of perineal mass    Type of Anesthesia Administered: Gen. endotracheal and infiltration Marcaine 0.5% with epinephrine    Estimated Blood Loss: Minimal    Blood Products: None    Specimen Obtained/Removed: perineal mass    Complication(s):  None    Graft/Implant/Prosthetics/Implanted Device/Transplants: None    Indication: patient is a 27-year-old white female    Findings: ) perineal subcutaneous mass measuring approximately 1 cm in diameter    Operative Report:  Patient was taken operating room laid no spine positional operating table.  General endotracheal anesthesia was induced.  She is placed lithotomy position and her perineal area prepped draped usual sterile fashion.  Incision was made over the palpable mass in the left perineal area at the 1 o'clock position.  I dissected a 1 cm mass surrounding fatty tissue.  I then closed in layers with 4-0 chromic and a subcuticular 4-0 chromic.  Marcaine 0.5% with epinephrine was infiltrated.  Neosporin and dressing applied.  The procedures and terminated.  Patient thought procedure very well and was returned recovery room in satisfactory condition    Discharge Summary:    Patient will be discharged home after recovery.  Patient will be seen back in the office in 7 days.  Percocet 5/325 one to 2 tablets every 4 hours when necessary for pain.  Patient is to call the office or hospital for any difficulties.       Electronically Signed by: Jonh Davis MD        Dictated Utilizing Dragon Dictation

## 2018-02-16 NOTE — ANESTHESIA POSTPROCEDURE EVALUATION
Patient: Basia Schulte    Procedure Summary     Date Anesthesia Start Anesthesia Stop Room / Location    02/16/18 0856 0938  [facility] / [facility]       Procedure Diagnosis Surgeon Provider    PERINEAL LESION/CYST EXCISION (N/A Back) Perineal mass in female  (Perineal mass in female [N90.9]) MD Ector Tirado MD          Anesthesia Type: general  Last vitals  BP   90/48 (02/16/18 1009)   Temp   97.4 °F (36.3 °C) (02/16/18 0939)   Pulse   62 (02/16/18 1014)   Resp   14 (02/16/18 1014)     SpO2   100 % (02/16/18 1014)     Post Anesthesia Care and Evaluation    Patient location during evaluation: PHASE II  Patient participation: complete - patient participated  Level of consciousness: awake and alert  Pain score: 1  Pain management: adequate  Airway patency: patent  Anesthetic complications: No anesthetic complications  PONV Status: controlled  Cardiovascular status: acceptable  Respiratory status: acceptable  Hydration status: acceptable

## 2018-02-16 NOTE — H&P (VIEW-ONLY)
2/15/2018    Patient Information  Basia Schulte  [address]  [facility] [address]  1990  [phone] (home)     Chief Complaint   Patient presents with   • Abscess     Perineal Abscess       HPI  Patient is a 27-year-old white female who has a tender lesion in the left perineal area.  Started 3 weeks ago.  She developed 2 courses of antibiotics and is not getting better.  She's had go to the emergency room because is hurting so bad.    Review of Systems:  The Past medical history, family history, social history, medication list, allergies, and Review of Systems has been reviewed and confirmed.    General: negative  Integumentary: lump and abscess   Eyes: glasses  ENT: negative  Respiratory: negative  Gastrointestinal: heartburn and blood in stool  Cardiovascular: negative  Neurological: negative  Psychiatric: anxiety  Hematologic/Lymphatic: negative  Genitourinary: negative  Musculoskeletal: negative  Endocrine: negative  Breasts: negative      Patient Active Problem List   Diagnosis   • Annual GYN exam w/o problems   • ADD (attention deficit disorder)   • Seizure disorder   • Smoker         Past Medical History:   Diagnosis Date   • ADD (attention deficit disorder) 2016    Orignally Dx as anxiety as kid   • ADHD (attention deficit hyperactivity disorder)    • History of kidney stones 2009   • PID (pelvic inflammatory disease) 2016    Tx after seen in ER - STD w/u was (-)   • Seizure disorder 2002   • Seizures    • Vitamin D deficiency          Past Surgical History:   Procedure Laterality Date   • CERVICAL CERCLAGE  2011   • CERVICAL CERCLAGE  04/2014   • D&C WITH SUCTION  2008   • D&C WITH SUCTION  08/2011   • EXTRACORPOREAL SHOCK WAVE LITHOTRIPSY (ESWL)  2009         Family History   Problem Relation Age of Onset   • Breast cancer Paternal Grandmother    • Ovarian cancer Maternal Aunt    • Anxiety disorder Mother    • Depression Mother    • Anxiety disorder Father    • Depression Father          Social  "History   Substance Use Topics   • Smoking status: Current Every Day Smoker     Packs/day: 1.00     Years: 10.00     Types: Cigarettes     Start date: 2003   • Smokeless tobacco: Never Used   • Alcohol use No       Current Outpatient Prescriptions   Medication Sig Dispense Refill   • doxycycline (MONODOX) 100 MG capsule Take 1 capsule by mouth 2 (Two) Times a Day for 10 days. 20 capsule 0   • folic acid (FOLVITE) 1 MG tablet Daily.  10   • guanFACINE (TENEX) 1 MG tablet Take 0.5 tablets by mouth Every Night. 30 tablet 0   • hydrocortisone (ANUSOL-HC) 2.5 % rectal cream Insert  into the rectum 2 (Two) Times a Day. 28.35 g 0   • lamoTRIgine (LaMICtal) 150 MG tablet 150 mg 2 (Two) Times a Day.  10   • medroxyPROGESTERone (DEPO-PROVERA) 150 MG/ML injection Inject 1 mL into the shoulder, thigh, or buttocks Every 3 (Three) Months. 1 each 4   • methylphenidate (CONCERTA) 18 MG CR tablet Take 1 tablet by mouth Daily 30 tablet 0   • Sulfamethoxazole-Trimethoprim (BACTRIM PO) Take  by mouth.     • Vitamin D, Cholecalciferol, 1000 units capsule Take  by mouth.       No current facility-administered medications for this visit.          Allergies  Review of patient's allergies indicates no known allergies.    /64  Pulse 73  Temp 98.2 °F (36.8 °C)  Resp 17  Ht 160 cm (63\")  Wt 53.5 kg (118 lb)  BMI 20.9 kg/m2     Physical Exam   Constitutional: She is oriented to person, place, and time. She appears well-developed and well-nourished. No distress.   HENT:   Head: Normocephalic.   Right Ear: External ear normal.   Left Ear: External ear normal.   Nose: Nose normal.   Mouth/Throat: Oropharynx is clear and moist.   Eyes: Conjunctivae and EOM are normal. Right eye exhibits no discharge. Left eye exhibits no discharge.   Neck: Normal range of motion. No JVD present. No tracheal deviation present. No thyromegaly present.   Cardiovascular: Normal rate, regular rhythm, normal heart sounds and intact distal pulses.  Exam " reveals no gallop and no friction rub.    No murmur heard.  Pulmonary/Chest: Effort normal and breath sounds normal. No stridor. No respiratory distress. She has no wheezes. She has no rales. She exhibits no tenderness.   Abdominal: Soft. Bowel sounds are normal. She exhibits no distension and no mass. There is no tenderness. There is no rebound and no guarding. No hernia.   Genitourinary: Rectal exam shows guaiac negative stool.   Musculoskeletal: Normal range of motion. She exhibits no edema, tenderness or deformity.   Lymphadenopathy:     She has no cervical adenopathy.   Neurological: She is alert and oriented to person, place, and time. She has normal reflexes. She displays normal reflexes. No cranial nerve deficit. She exhibits normal muscle tone. Coordination normal.   Skin: Skin is warm and dry. No rash noted. She is not diaphoretic. No erythema. No pallor.   Left perineal area at the 1 o'clock position in relation to the rectum there is a 1 cm subcutaneous mass which is tender   Psychiatric: She has a normal mood and affect. Her behavior is normal. Judgment and thought content normal.   Nursing note and vitals reviewed.                ASSESSMENT  Perineal mass        PLAN    Excised in the OR.          This document signed by Jonh Davis MD February 15, 2018 10:54 AM

## 2018-02-16 NOTE — ANESTHESIA PREPROCEDURE EVALUATION
Anesthesia Evaluation     Patient summary reviewed and Nursing notes reviewed   no history of anesthetic complications:  NPO Solid Status: > 8 hours  NPO Liquid Status: > 8 hours           Airway   Mallampati: II  TM distance: >3 FB  Neck ROM: full  no difficulty expected  Dental - normal exam     Pulmonary - normal exam   (+) a smoker Current Abstained day of surgery,   (-) asthma  Cardiovascular - negative cardio ROS and normal exam  Exercise tolerance: good (4-7 METS)    NYHA Classification: II    (-) hypertension, past MI, dysrhythmias, angina, CHF, hyperlipidemia      Neuro/Psych  (+) seizures (last one 5 yrs ago),     GI/Hepatic/Renal/Endo    (+)  GERD, renal disease stones,   (-) liver disease, diabetes, hypothyroidism    Musculoskeletal (-) negative ROS    Abdominal  - normal exam    Bowel sounds: normal.   Substance History - negative use     OB/GYN negative ob/gyn ROS         Other - negative ROS       (-) arthritis                  Anesthesia Plan    ASA 2     general     intravenous induction   Anesthetic plan and risks discussed with patient.  Use of blood products discussed with patient  Consented to blood products.

## 2018-02-17 ENCOUNTER — HOSPITAL ENCOUNTER (EMERGENCY)
Facility: HOSPITAL | Age: 28
Discharge: HOME | End: 2018-02-18
Attending: EMERGENCY MEDICINE | Admitting: EMERGENCY MEDICINE

## 2018-02-17 DIAGNOSIS — IMO0001: Primary | ICD-10-CM

## 2018-02-17 PROCEDURE — 85025 COMPLETE CBC W/AUTO DIFF WBC: CPT | Performed by: PHYSICIAN ASSISTANT

## 2018-02-17 PROCEDURE — 25010000002 MORPHINE PER 10 MG: Performed by: EMERGENCY MEDICINE

## 2018-02-17 PROCEDURE — 80053 COMPREHEN METABOLIC PANEL: CPT | Performed by: PHYSICIAN ASSISTANT

## 2018-02-17 PROCEDURE — 99283 EMERGENCY DEPT VISIT LOW MDM: CPT

## 2018-02-17 PROCEDURE — 96375 TX/PRO/DX INJ NEW DRUG ADDON: CPT

## 2018-02-17 PROCEDURE — 86140 C-REACTIVE PROTEIN: CPT | Performed by: PHYSICIAN ASSISTANT

## 2018-02-17 PROCEDURE — 36415 COLL VENOUS BLD VENIPUNCTURE: CPT

## 2018-02-17 PROCEDURE — 25010000002 ONDANSETRON PER 1 MG: Performed by: EMERGENCY MEDICINE

## 2018-02-17 RX ORDER — ACETAMINOPHEN 500 MG
1000 TABLET ORAL ONCE
Status: COMPLETED | OUTPATIENT
Start: 2018-02-17 | End: 2018-02-17

## 2018-02-17 RX ORDER — SODIUM CHLORIDE 0.9 % (FLUSH) 0.9 %
10 SYRINGE (ML) INJECTION AS NEEDED
Status: DISCONTINUED | OUTPATIENT
Start: 2018-02-17 | End: 2018-02-18 | Stop reason: HOSPADM

## 2018-02-17 RX ORDER — MORPHINE SULFATE 2 MG/ML
2 INJECTION, SOLUTION INTRAMUSCULAR; INTRAVENOUS ONCE
Status: COMPLETED | OUTPATIENT
Start: 2018-02-17 | End: 2018-02-17

## 2018-02-17 RX ORDER — ONDANSETRON 2 MG/ML
4 INJECTION INTRAMUSCULAR; INTRAVENOUS ONCE
Status: COMPLETED | OUTPATIENT
Start: 2018-02-17 | End: 2018-02-17

## 2018-02-17 RX ADMIN — MORPHINE SULFATE 2 MG: 2 INJECTION, SOLUTION INTRAMUSCULAR; INTRAVENOUS at 23:47

## 2018-02-17 RX ADMIN — ACETAMINOPHEN 1000 MG: 500 TABLET ORAL at 23:47

## 2018-02-17 RX ADMIN — ONDANSETRON 4 MG: 2 INJECTION, SOLUTION INTRAMUSCULAR; INTRAVENOUS at 23:47

## 2018-02-18 VITALS
TEMPERATURE: 97.6 F | BODY MASS INDEX: 20.91 KG/M2 | DIASTOLIC BLOOD PRESSURE: 60 MMHG | WEIGHT: 118 LBS | HEIGHT: 63 IN | RESPIRATION RATE: 18 BRPM | OXYGEN SATURATION: 98 % | SYSTOLIC BLOOD PRESSURE: 106 MMHG | HEART RATE: 87 BPM

## 2018-02-18 LAB
ALBUMIN SERPL-MCNC: 4.1 G/DL (ref 3.5–5)
ALBUMIN/GLOB SERPL: 1.6 G/DL (ref 1.5–2.5)
ALP SERPL-CCNC: 63 U/L (ref 35–104)
ALT SERPL W P-5'-P-CCNC: 8 U/L (ref 10–36)
ANION GAP SERPL CALCULATED.3IONS-SCNC: 2.3 MMOL/L (ref 3.6–11.2)
AST SERPL-CCNC: 22 U/L (ref 10–30)
BASOPHILS # BLD AUTO: 0.02 10*3/MM3 (ref 0–0.3)
BASOPHILS NFR BLD AUTO: 0.1 % (ref 0–2)
BILIRUB SERPL-MCNC: 0.4 MG/DL (ref 0.2–1.8)
BUN BLD-MCNC: 11 MG/DL (ref 7–21)
BUN/CREAT SERPL: 14.9 (ref 7–25)
CALCIUM SPEC-SCNC: 8.3 MG/DL (ref 7.7–10)
CHLORIDE SERPL-SCNC: 108 MMOL/L (ref 99–112)
CO2 SERPL-SCNC: 24.7 MMOL/L (ref 24.3–31.9)
CREAT BLD-MCNC: 0.74 MG/DL (ref 0.43–1.29)
CRP SERPL-MCNC: <0.5 MG/DL (ref 0–0.99)
DEPRECATED RDW RBC AUTO: 39.8 FL (ref 37–54)
EOSINOPHIL # BLD AUTO: 0.01 10*3/MM3 (ref 0–0.7)
EOSINOPHIL NFR BLD AUTO: 0.1 % (ref 0–5)
ERYTHROCYTE [DISTWIDTH] IN BLOOD BY AUTOMATED COUNT: 11.6 % (ref 11.5–14.5)
GFR SERPL CREATININE-BSD FRML MDRD: 95 ML/MIN/1.73
GLOBULIN UR ELPH-MCNC: 2.6 GM/DL
GLUCOSE BLD-MCNC: 118 MG/DL (ref 70–110)
HCT VFR BLD AUTO: 37.6 % (ref 37–47)
HGB BLD-MCNC: 12.4 G/DL (ref 12–16)
IMM GRANULOCYTES # BLD: 0.02 10*3/MM3 (ref 0–0.03)
IMM GRANULOCYTES NFR BLD: 0.1 % (ref 0–0.5)
LYMPHOCYTES # BLD AUTO: 1.28 10*3/MM3 (ref 1–3)
LYMPHOCYTES NFR BLD AUTO: 9.4 % (ref 21–51)
MCH RBC QN AUTO: 32.1 PG (ref 27–33)
MCHC RBC AUTO-ENTMCNC: 33 G/DL (ref 33–37)
MCV RBC AUTO: 97.4 FL (ref 80–94)
MONOCYTES # BLD AUTO: 0.9 10*3/MM3 (ref 0.1–0.9)
MONOCYTES NFR BLD AUTO: 6.6 % (ref 0–10)
NEUTROPHILS # BLD AUTO: 11.42 10*3/MM3 (ref 1.4–6.5)
NEUTROPHILS NFR BLD AUTO: 83.7 % (ref 30–70)
OSMOLALITY SERPL CALC.SUM OF ELEC: 270.6 MOSM/KG (ref 273–305)
PLATELET # BLD AUTO: 225 10*3/MM3 (ref 130–400)
PMV BLD AUTO: 11.5 FL (ref 6–10)
POTASSIUM BLD-SCNC: 4.1 MMOL/L (ref 3.5–5.3)
PROT SERPL-MCNC: 6.7 G/DL (ref 6–8)
RBC # BLD AUTO: 3.86 10*6/MM3 (ref 4.2–5.4)
SODIUM BLD-SCNC: 135 MMOL/L (ref 135–153)
WBC NRBC COR # BLD: 13.65 10*3/MM3 (ref 4.5–12.5)

## 2018-02-18 PROCEDURE — 96365 THER/PROPH/DIAG IV INF INIT: CPT

## 2018-02-18 RX ORDER — CLINDAMYCIN PHOSPHATE 600 MG/50ML
600 INJECTION INTRAVENOUS ONCE
Status: COMPLETED | OUTPATIENT
Start: 2018-02-18 | End: 2018-02-18

## 2018-02-18 RX ADMIN — CLINDAMYCIN PHOSPHATE 600 MG: 12 INJECTION, SOLUTION INTRAVENOUS at 00:19

## 2018-02-18 NOTE — ED PROVIDER NOTES
Subjective   HPI Comments: 27-year-old white female presents to ER complaints of pain from post surgical procedure done 1 day prior to arrival.  Patient admitted that she had an abscess in the perianal area that was surgically drained by Dr. aDvis 1 day prior to arrival.  Patient admitted that she is currently taking doxycycline as well as Percocet for pain.  Patient admitted that the area started to swell more and she became concerned.    Patient is a 27 y.o. female presenting with abscess.   History provided by:  Patient  Abscess   Location:  Pelvis  Pelvic abscess location:  Perianal  Size:  1cm  Abscess quality: draining, painful, redness and warmth    Red streaking: no    Duration:  1 day  Progression:  Improving  Pain details:     Quality:  Throbbing and pressure    Severity:  Moderate    Timing:  Constant    Progression:  Worsening  Associated symptoms: no fever        Review of Systems   Constitutional: Negative.  Negative for fever.   HENT: Negative.    Respiratory: Negative.    Cardiovascular: Negative.  Negative for chest pain.   Gastrointestinal: Negative.  Negative for abdominal pain.   Endocrine: Negative.    Genitourinary: Negative.  Negative for dysuria.   Skin: Positive for color change and wound.   Neurological: Negative.    Psychiatric/Behavioral: Negative.    All other systems reviewed and are negative.      Past Medical History:   Diagnosis Date   • ADD (attention deficit disorder) 2016    Orignally Dx as anxiety as kid   • ADHD (attention deficit hyperactivity disorder)    • History of kidney stones 2009   • PID (pelvic inflammatory disease) 2016    Tx after seen in ER - STD w/u was (-)   • Seizure disorder 2002   • Seizures    • Vitamin D deficiency        No Known Allergies    Past Surgical History:   Procedure Laterality Date   • CERVICAL CERCLAGE  2011   • CERVICAL CERCLAGE  04/2014   • COLONOSCOPY     • D&C WITH SUCTION  2008   • D&C WITH SUCTION  08/2011   • EXTRACORPOREAL SHOCK WAVE  LITHOTRIPSY (ESWL)  2009       Family History   Problem Relation Age of Onset   • Breast cancer Paternal Grandmother    • Ovarian cancer Maternal Aunt    • Anxiety disorder Mother    • Depression Mother    • Anxiety disorder Father    • Depression Father        Social History     Social History   • Marital status:      Spouse name: N/A   • Number of children: N/A   • Years of education: N/A     Social History Main Topics   • Smoking status: Current Every Day Smoker     Packs/day: 1.00     Years: 10.00     Types: Cigarettes     Start date: 2003   • Smokeless tobacco: Never Used   • Alcohol use No   • Drug use: No   • Sexual activity: Defer     Other Topics Concern   • Not on file     Social History Narrative           Objective   Physical Exam   Constitutional: She is oriented to person, place, and time. She appears well-developed and well-nourished. No distress.   HENT:   Head: Normocephalic and atraumatic.   Nose: Nose normal.   Eyes: Conjunctivae are normal.   Neck: Normal range of motion. Neck supple. No JVD present. No tracheal deviation present.   Cardiovascular: Normal rate.    No murmur heard.  Pulmonary/Chest: Effort normal. No respiratory distress. She has no wheezes.   Abdominal: Soft. There is no tenderness.   Musculoskeletal: Normal range of motion. She exhibits no edema or deformity.   Neurological: She is alert and oriented to person, place, and time. No cranial nerve deficit.   Skin: Skin is warm and dry. No rash noted. She is not diaphoretic. There is erythema. No pallor.   Erythema and edema noted at surgical site which is located in the perianal area.  however no active draining is noted.  Site appears clean with no signs of surgical wound dehiscence.   Psychiatric: She has a normal mood and affect. Her behavior is normal. Thought content normal.   Nursing note and vitals reviewed.      Procedures         ED Course  ED Course                  MDM  Number of Diagnoses or Management  Options  Postoperative abscess, sequela: established and improving     Amount and/or Complexity of Data Reviewed  Clinical lab tests: reviewed    Risk of Complications, Morbidity, and/or Mortality  Presenting problems: low  Diagnostic procedures: low  Management options: low    Patient Progress  Patient progress: stable      Final diagnoses:   Postoperative abscess, sequela            SAMIA Palomo  02/18/18 0030

## 2018-02-18 NOTE — ED NOTES
Patient has small amount of swelling and redness noted to left inner buttock surrounding incision site. No active bleeding noted. Provider aware     Rachel Martin RN  02/18/18 0006

## 2018-02-19 LAB
LAB AP CASE REPORT: NORMAL
Lab: NORMAL
PATH REPORT.FINAL DX SPEC: NORMAL

## 2018-02-22 ENCOUNTER — OFFICE VISIT (OUTPATIENT)
Dept: SURGERY | Facility: CLINIC | Age: 28
End: 2018-02-22

## 2018-02-22 VITALS
HEIGHT: 63 IN | DIASTOLIC BLOOD PRESSURE: 47 MMHG | HEART RATE: 69 BPM | RESPIRATION RATE: 16 BRPM | WEIGHT: 118 LBS | SYSTOLIC BLOOD PRESSURE: 91 MMHG | TEMPERATURE: 98.3 F | BODY MASS INDEX: 20.91 KG/M2

## 2018-02-22 DIAGNOSIS — N90.89 PERINEAL MASS IN FEMALE: Primary | ICD-10-CM

## 2018-02-22 PROCEDURE — 99024 POSTOP FOLLOW-UP VISIT: CPT | Performed by: SURGERY

## 2018-02-22 NOTE — PROGRESS NOTES
2/22/2018    Patient Information  Basia Schulte  [address]  [facility] [address]  1990  [phone] (home)     Chief Complaint   Patient presents with   • Post-op     Post Excision Perineal Mass       HPI  Patient is a 27-year-old white female status post excision of perineal mass which turned out to be a chronic abscess.  She is doing well    Patient Active Problem List   Diagnosis   • Annual GYN exam w/o problems   • ADD (attention deficit disorder)   • Seizure disorder   • Smoker   • Perineal mass in female         Past Medical History:   Diagnosis Date   • ADD (attention deficit disorder) 2016    Orignally Dx as anxiety as kid   • ADHD (attention deficit hyperactivity disorder)    • History of kidney stones 2009   • PID (pelvic inflammatory disease) 2016    Tx after seen in ER - STD w/u was (-)   • Seizure disorder 2002   • Seizures    • Vitamin D deficiency          Past Surgical History:   Procedure Laterality Date   • CERVICAL CERCLAGE  2011   • CERVICAL CERCLAGE  04/2014   • COLONOSCOPY     • D&C WITH SUCTION  2008   • D&C WITH SUCTION  08/2011   • EXTRACORPOREAL SHOCK WAVE LITHOTRIPSY (ESWL)  2009   • PERINEAL LESION/CYST EXCISION N/A 2/16/2018    Procedure: PERINEAL LESION/CYST EXCISION;  Surgeon: Jonh Davis MD;  [facility];  Service:          Family History   Problem Relation Age of Onset   • Breast cancer Paternal Grandmother    • Ovarian cancer Maternal Aunt    • Anxiety disorder Mother    • Depression Mother    • Anxiety disorder Father    • Depression Father          Social History   Substance Use Topics   • Smoking status: Current Every Day Smoker     Packs/day: 1.00     Years: 10.00     Types: Cigarettes     Start date: 2003   • Smokeless tobacco: Never Used   • Alcohol use No       Current Outpatient Prescriptions   Medication Sig Dispense Refill   • fluconazole (DIFLUCAN) 200 MG tablet Take 1 tablet by mouth Daily for 6 days. This is prn. 6 tablet 0   • folic acid (FOLVITE) 1 MG  "tablet Daily.  10   • guanFACINE (TENEX) 1 MG tablet Take 0.5 tablets by mouth Every Night. 30 tablet 0   • hydrocortisone (ANUSOL-HC) 2.5 % rectal cream Insert  into the rectum 2 (Two) Times a Day. 28.35 g 0   • lamoTRIgine (LaMICtal) 150 MG tablet 150 mg 2 (Two) Times a Day.  10   • medroxyPROGESTERone (DEPO-PROVERA) 150 MG/ML injection Inject 1 mL into the shoulder, thigh, or buttocks Every 3 (Three) Months. 1 each 4   • methylphenidate (CONCERTA) 18 MG CR tablet Take 1 tablet by mouth Daily 30 tablet 0   • ondansetron (ZOFRAN) 4 MG tablet Take 1 tablet by mouth Daily As Needed for Nausea or Vomiting. 30 tablet 1   • oxyCODONE-acetaminophen (PERCOCET) 5-325 MG per tablet Take 1-2 tablets by mouth Every 4 (Four) Hours As Needed for pain. 30 tablet 0     No current facility-administered medications for this visit.          Allergies  Review of patient's allergies indicates no known allergies.    BP 91/47  Pulse 69  Temp 98.3 °F (36.8 °C)  Resp 16  Ht 160 cm (63\")  Wt 53.5 kg (118 lb)  BMI 20.9 kg/m2     Physical Exam  Gen. a 27 or white female in no distress  Perineal wound looks excellent            ASSESSMENT  Status post excision of chronic perineal abscess        PLAN    Return when necessary          This document signed by Jonh Davis MD February 22, 2018 10:40 AM   "

## 2018-02-26 ENCOUNTER — OFFICE VISIT (OUTPATIENT)
Dept: PSYCHIATRY | Facility: CLINIC | Age: 28
End: 2018-02-26

## 2018-02-26 VITALS
HEIGHT: 63 IN | SYSTOLIC BLOOD PRESSURE: 103 MMHG | DIASTOLIC BLOOD PRESSURE: 70 MMHG | HEART RATE: 77 BPM | WEIGHT: 117 LBS | BODY MASS INDEX: 20.73 KG/M2

## 2018-02-26 DIAGNOSIS — G40.909 SEIZURE DISORDER (HCC): ICD-10-CM

## 2018-02-26 DIAGNOSIS — F90.2 ADHD (ATTENTION DEFICIT HYPERACTIVITY DISORDER), COMBINED TYPE: Primary | ICD-10-CM

## 2018-02-26 DIAGNOSIS — N90.89 PERINEAL MASS IN FEMALE: ICD-10-CM

## 2018-02-26 PROCEDURE — 99213 OFFICE O/P EST LOW 20 MIN: CPT | Performed by: NURSE PRACTITIONER

## 2018-02-26 RX ORDER — GUANFACINE 1 MG/1
0.5 TABLET ORAL NIGHTLY
Qty: 30 TABLET | Refills: 0 | Status: SHIPPED | OUTPATIENT
Start: 2018-02-26 | End: 2018-04-03 | Stop reason: SDUPTHER

## 2018-02-26 RX ORDER — METHYLPHENIDATE HYDROCHLORIDE 18 MG/1
18 TABLET ORAL DAILY
Qty: 30 TABLET | Refills: 0 | Status: SHIPPED | OUTPATIENT
Start: 2018-02-26 | End: 2018-02-26 | Stop reason: SDUPTHER

## 2018-02-26 RX ORDER — METHYLPHENIDATE HYDROCHLORIDE 18 MG/1
18 TABLET ORAL DAILY
Qty: 30 TABLET | Refills: 0 | Status: SHIPPED | OUTPATIENT
Start: 2018-02-26 | End: 2018-03-01 | Stop reason: SDUPTHER
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
## 2018-03-01 DIAGNOSIS — F90.2 ADHD (ATTENTION DEFICIT HYPERACTIVITY DISORDER), COMBINED TYPE: ICD-10-CM

## 2018-03-01 DIAGNOSIS — G40.909 SEIZURE DISORDER (HCC): ICD-10-CM

## 2018-03-01 DIAGNOSIS — N90.89 PERINEAL MASS IN FEMALE: ICD-10-CM

## 2018-03-01 RX ORDER — METHYLPHENIDATE HYDROCHLORIDE 18 MG/1
18 TABLET ORAL DAILY
Qty: 30 TABLET | Refills: 0 | Status: SHIPPED | OUTPATIENT
Start: 2018-03-01 | End: 2018-04-03 | Stop reason: SDUPTHER

## 2018-04-03 DIAGNOSIS — N90.89 PERINEAL MASS IN FEMALE: ICD-10-CM

## 2018-04-03 DIAGNOSIS — F90.2 ADHD (ATTENTION DEFICIT HYPERACTIVITY DISORDER), COMBINED TYPE: ICD-10-CM

## 2018-04-03 DIAGNOSIS — G40.909 SEIZURE DISORDER (HCC): ICD-10-CM

## 2018-04-03 RX ORDER — METHYLPHENIDATE HYDROCHLORIDE 18 MG/1
18 TABLET ORAL DAILY
Qty: 30 TABLET | Refills: 0 | Status: SHIPPED | OUTPATIENT
Start: 2018-04-03 | End: 2018-04-27 | Stop reason: SDUPTHER

## 2018-04-03 RX ORDER — GUANFACINE 1 MG/1
0.5 TABLET ORAL NIGHTLY
Qty: 30 TABLET | Refills: 0 | Status: SHIPPED | OUTPATIENT
Start: 2018-04-03 | End: 2018-05-14 | Stop reason: SDUPTHER

## 2018-04-27 DIAGNOSIS — N90.89 PERINEAL MASS IN FEMALE: ICD-10-CM

## 2018-04-27 DIAGNOSIS — G40.909 SEIZURE DISORDER (HCC): ICD-10-CM

## 2018-04-27 DIAGNOSIS — F90.2 ADHD (ATTENTION DEFICIT HYPERACTIVITY DISORDER), COMBINED TYPE: ICD-10-CM

## 2018-04-30 ENCOUNTER — TELEPHONE (OUTPATIENT)
Dept: PSYCHIATRY | Facility: CLINIC | Age: 28
End: 2018-04-30

## 2018-04-30 RX ORDER — METHYLPHENIDATE HYDROCHLORIDE 18 MG/1
18 TABLET ORAL DAILY
Qty: 30 TABLET | Refills: 0 | Status: SHIPPED | OUTPATIENT
Start: 2018-04-30 | End: 2018-05-02 | Stop reason: SDUPTHER

## 2018-04-30 NOTE — TELEPHONE ENCOUNTER
Uma from the Pharmacy called stating your FL[facility] is not going through for pts concerta to be filled, states they will need a new script sent in from another provider. Please advise

## 2018-05-02 DIAGNOSIS — F90.2 ADHD (ATTENTION DEFICIT HYPERACTIVITY DISORDER), COMBINED TYPE: ICD-10-CM

## 2018-05-02 DIAGNOSIS — G40.909 SEIZURE DISORDER (HCC): ICD-10-CM

## 2018-05-02 DIAGNOSIS — N90.89 PERINEAL MASS IN FEMALE: ICD-10-CM

## 2018-05-02 RX ORDER — METHYLPHENIDATE HYDROCHLORIDE 18 MG/1
18 TABLET ORAL DAILY
Qty: 30 TABLET | Refills: 0 | Status: SHIPPED | OUTPATIENT
Start: 2018-05-02 | End: 2018-05-14 | Stop reason: SDUPTHER

## 2018-05-14 ENCOUNTER — OFFICE VISIT (OUTPATIENT)
Dept: PSYCHIATRY | Facility: CLINIC | Age: 28
End: 2018-05-14

## 2018-05-14 VITALS
BODY MASS INDEX: 21.79 KG/M2 | SYSTOLIC BLOOD PRESSURE: 101 MMHG | WEIGHT: 123 LBS | HEIGHT: 63 IN | DIASTOLIC BLOOD PRESSURE: 69 MMHG | HEART RATE: 92 BPM

## 2018-05-14 DIAGNOSIS — G40.909 SEIZURE DISORDER (HCC): ICD-10-CM

## 2018-05-14 DIAGNOSIS — F90.2 ADHD (ATTENTION DEFICIT HYPERACTIVITY DISORDER), COMBINED TYPE: ICD-10-CM

## 2018-05-14 PROCEDURE — 99213 OFFICE O/P EST LOW 20 MIN: CPT | Performed by: NURSE PRACTITIONER

## 2018-05-14 RX ORDER — METHYLPHENIDATE HYDROCHLORIDE 18 MG/1
18 TABLET ORAL DAILY
Qty: 30 TABLET | Refills: 0 | Status: SHIPPED | OUTPATIENT
Start: 2018-05-14 | End: 2018-06-07 | Stop reason: SDUPTHER

## 2018-05-14 RX ORDER — GUANFACINE 1 MG/1
0.5 TABLET ORAL NIGHTLY
Qty: 30 TABLET | Refills: 0 | Status: SHIPPED | OUTPATIENT
Start: 2018-05-14 | End: 2018-08-06 | Stop reason: SDUPTHER

## 2018-05-14 NOTE — PROGRESS NOTES
"Subjective   Basia Schulte is a 28 y.o. female who is here today for medication management follow up.    Chief Complaint:I've restarted on the medication for school.    HPI: History of Present Illness  The patient presents today for follow-up.  She does report that her symptoms are much improved with her medication.  She denies any significant attention or focus problems when she \"takes her medicine\".  Patient is sleeping well generally however she does report that last evening she stayed up too late \"reading\".  She denies any mood symptoms, thoughts of self harm or suicide.  Patient is denying any seizures.  Patient adamantly denies any mood symptoms she denies any thoughts to harm herself or others she denies any psychotic phenomenia.    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  Has recently started exerciseing  .     The following portions of the patient's history were reviewed and updated as appropriate: allergies, current medications, past family history, past medical history, past social history, past surgical history and problem list.    Review of Systems  Patient denies any recent medical illnesses.  No acute cardiopulmonary symptoms evident.  No acute gastrointestinal complaints.  Patient's appetite and intake are adequate.  Patient's current weight compared with last weight.    Objective   Physical Exam  Blood pressure 101/69, pulse 92, height 160 cm (62.99\"), weight 55.8 kg (123 lb), not currently breastfeeding.    No Known Allergies    Current Medications:   Current Outpatient Prescriptions   Medication Sig Dispense Refill   • folic acid (FOLVITE) 1 MG tablet Daily.  10   • guanFACINE (TENEX) 1 MG tablet Take 0.5 tablets by mouth Every Night. 30 tablet 0   • lamoTRIgine (LaMICtal) 150 MG tablet 150 mg 2 (Two) Times a Day.  10   • medroxyPROGESTERone (DEPO-PROVERA) 150 MG/ML injection Inject 1 mL into the shoulder, thigh, or buttocks Every 3 (Three) Months. 1 each 4 "   • methylphenidate (CONCERTA) 18 MG CR tablet Take 1 tablet by mouth Daily 30 tablet 0     No current facility-administered medications for this visit.        Mental Status Exam:   Hygiene:   fair  Cooperation:  Cooperative  Eye Contact:  Good  Psychomotor Behavior:  Appropriate  Affect:  Full range  Hopelessness: Denies  Speech:  Normal  Thought Process:  Goal directed and Linear   Thought Content:  Normal and Mood congurent  Suicidal:  None  Homicidal:  None  Hallucinations:  None  Delusion:  None  Memory:  Intact  Orientation:  Person, Place, Time and Situation  Reliability:  fair  Insight:  Fair  Judgement:  Fair  Impulse Control:  Fair  Physical/Medical Issues:  No     Assessment/Plan   Diagnoses and all orders for this visit:    ADHD (attention deficit hyperactivity disorder), combined type  -     methylphenidate (CONCERTA) 18 MG CR tablet; Take 1 tablet by mouth Daily  -     guanFACINE (TENEX) 1 MG tablet; Take 0.5 tablets by mouth Every Night.    Seizure disorder  -     methylphenidate (CONCERTA) 18 MG CR tablet; Take 1 tablet by mouth Daily  -     guanFACINE (TENEX) 1 MG tablet; Take 0.5 tablets by mouth Every Night.              Errors in dictation may reflect use of voice recognition software and not all errors in transcription may have been detected prior to signing.Patient is being prescribed a controlled substance as part of treatment plan. Patient has been educated of appropriate use of the medications, including risk of somnolence, limited ability to drive and/or work safely, and potential for dependence, respiratory depression and overdose. Patient is also informed that the medication are to be used by the patient only- avoid any combined use of ETOH or other substances unless   Kyle report of past 12 months reviewed with no new issues- . Patient reports taking medication as prescribed.  Patient denies any abuse/misuse of medication.  Patient denies any other substance use issues.  No apparent  substance related issues.  Patient appropriate to continue with medication.  Reinforced risk of medication.  Patient was provided education regarding controlled substances contract was signed.  Patient consistently and adamantly denies any substance related problems.  We discussed risks, benefits, and side effects of the above medication and the patient was agreeable with the plan.    No Follow-up on file.  The patient was instructed to call clinic as needed or go to ER if in crisis.  Patient was instructed to immediately call 911 or come to the nearest emergency room for thoughts to harm self or others.

## 2018-06-07 DIAGNOSIS — G40.909 SEIZURE DISORDER (HCC): ICD-10-CM

## 2018-06-07 DIAGNOSIS — F90.2 ADHD (ATTENTION DEFICIT HYPERACTIVITY DISORDER), COMBINED TYPE: ICD-10-CM

## 2018-06-11 RX ORDER — METHYLPHENIDATE HYDROCHLORIDE 18 MG/1
18 TABLET ORAL DAILY
Qty: 30 TABLET | Refills: 0 | Status: SHIPPED | OUTPATIENT
Start: 2018-06-11 | End: 2018-07-25 | Stop reason: SDUPTHER

## 2018-06-14 ENCOUNTER — HOSPITAL ENCOUNTER (OUTPATIENT)
Dept: CARDIOLOGY | Facility: HOSPITAL | Age: 28
Discharge: HOME | End: 2018-06-14
Admitting: NURSE PRACTITIONER

## 2018-06-14 ENCOUNTER — APPOINTMENT (OUTPATIENT)
Dept: CARDIOLOGY | Facility: HOSPITAL | Age: 28
End: 2018-06-14

## 2018-06-14 ENCOUNTER — OFFICE VISIT (OUTPATIENT)
Dept: CARDIOLOGY | Facility: HOSPITAL | Age: 28
End: 2018-06-14

## 2018-06-14 ENCOUNTER — HOSPITAL ENCOUNTER (OUTPATIENT)
Dept: CARDIOLOGY | Facility: HOSPITAL | Age: 28
Discharge: HOME | End: 2018-06-14

## 2018-06-14 VITALS
SYSTOLIC BLOOD PRESSURE: 102 MMHG | WEIGHT: 120 LBS | RESPIRATION RATE: 17 BRPM | HEART RATE: 71 BPM | DIASTOLIC BLOOD PRESSURE: 69 MMHG | TEMPERATURE: 97.8 F | BODY MASS INDEX: 21.26 KG/M2 | OXYGEN SATURATION: 99 % | HEIGHT: 63 IN

## 2018-06-14 DIAGNOSIS — R00.0 TACHYCARDIA: Primary | ICD-10-CM

## 2018-06-14 DIAGNOSIS — R07.89 CHEST TIGHTNESS: ICD-10-CM

## 2018-06-14 DIAGNOSIS — R00.0 TACHYCARDIA: ICD-10-CM

## 2018-06-14 LAB
ANION GAP SERPL CALCULATED.3IONS-SCNC: 7 MMOL/L (ref 3–11)
BASOPHILS # BLD AUTO: 0.03 10*3/MM3 (ref 0–0.2)
BASOPHILS NFR BLD AUTO: 0.3 % (ref 0–1)
BUN BLD-MCNC: 13 MG/DL (ref 9–23)
BUN/CREAT SERPL: 18.8 (ref 7–25)
CALCIUM SPEC-SCNC: 9.2 MG/DL (ref 8.7–10.4)
CHLORIDE SERPL-SCNC: 107 MMOL/L (ref 99–109)
CO2 SERPL-SCNC: 25 MMOL/L (ref 20–31)
CREAT BLD-MCNC: 0.69 MG/DL (ref 0.6–1.3)
DEPRECATED FTI SERPL-MCNC: 2.2 TBI
DEPRECATED RDW RBC AUTO: 43.2 FL (ref 37–54)
EOSINOPHIL # BLD AUTO: 0.11 10*3/MM3 (ref 0–0.3)
EOSINOPHIL NFR BLD AUTO: 1.3 % (ref 0–3)
ERYTHROCYTE [DISTWIDTH] IN BLOOD BY AUTOMATED COUNT: 12.2 % (ref 11.3–14.5)
GFR SERPL CREATININE-BSD FRML MDRD: 102 ML/MIN/1.73
GLUCOSE BLD-MCNC: 73 MG/DL (ref 70–100)
HCT VFR BLD AUTO: 41.5 % (ref 34.5–44)
HGB BLD-MCNC: 14 G/DL (ref 11.5–15.5)
IMM GRANULOCYTES # BLD: 0.01 10*3/MM3 (ref 0–0.03)
IMM GRANULOCYTES NFR BLD: 0.1 % (ref 0–0.6)
LYMPHOCYTES # BLD AUTO: 2.02 10*3/MM3 (ref 0.6–4.8)
LYMPHOCYTES NFR BLD AUTO: 23 % (ref 24–44)
MAGNESIUM SERPL-MCNC: 1.8 MG/DL (ref 1.3–2.7)
MCH RBC QN AUTO: 32.5 PG (ref 27–31)
MCHC RBC AUTO-ENTMCNC: 33.7 G/DL (ref 32–36)
MCV RBC AUTO: 96.3 FL (ref 80–99)
MONOCYTES # BLD AUTO: 0.76 10*3/MM3 (ref 0–1)
MONOCYTES NFR BLD AUTO: 8.6 % (ref 0–12)
NEUTROPHILS # BLD AUTO: 5.88 10*3/MM3 (ref 1.5–8.3)
NEUTROPHILS NFR BLD AUTO: 66.8 % (ref 41–71)
PLATELET # BLD AUTO: 282 10*3/MM3 (ref 150–450)
PMV BLD AUTO: 11.9 FL (ref 6–12)
POTASSIUM BLD-SCNC: 3.9 MMOL/L (ref 3.5–5.5)
RBC # BLD AUTO: 4.31 10*6/MM3 (ref 3.89–5.14)
SODIUM BLD-SCNC: 139 MMOL/L (ref 132–146)
T3RU NFR SERPL: 31.3 % (ref 23–37)
T4 SERPL-MCNC: 7.1 MCG/DL (ref 4.7–11.4)
TSH SERPL DL<=0.05 MIU/L-ACNC: 1.61 MIU/ML (ref 0.35–5.35)
WBC NRBC COR # BLD: 8.8 10*3/MM3 (ref 3.5–10.8)

## 2018-06-14 PROCEDURE — 83735 ASSAY OF MAGNESIUM: CPT | Performed by: NURSE PRACTITIONER

## 2018-06-14 PROCEDURE — 93010 ELECTROCARDIOGRAM REPORT: CPT | Performed by: INTERNAL MEDICINE

## 2018-06-14 PROCEDURE — 80048 BASIC METABOLIC PNL TOTAL CA: CPT | Performed by: NURSE PRACTITIONER

## 2018-06-14 PROCEDURE — 0296T HC EXT ECG > 48HR TO 21 DAY RCRD W/CONECT INTL RCRD: CPT

## 2018-06-14 PROCEDURE — 84443 ASSAY THYROID STIM HORMONE: CPT | Performed by: NURSE PRACTITIONER

## 2018-06-14 PROCEDURE — 93005 ELECTROCARDIOGRAM TRACING: CPT | Performed by: NURSE PRACTITIONER

## 2018-06-14 PROCEDURE — 84479 ASSAY OF THYROID (T3 OR T4): CPT | Performed by: NURSE PRACTITIONER

## 2018-06-14 PROCEDURE — 0298T HOLTER MONITOR - 72 HOUR UP TO 21 DAY: CPT | Performed by: INTERNAL MEDICINE

## 2018-06-14 PROCEDURE — 85025 COMPLETE CBC W/AUTO DIFF WBC: CPT | Performed by: NURSE PRACTITIONER

## 2018-06-14 PROCEDURE — 84436 ASSAY OF TOTAL THYROXINE: CPT | Performed by: NURSE PRACTITIONER

## 2018-06-14 PROCEDURE — 99214 OFFICE O/P EST MOD 30 MIN: CPT | Performed by: NURSE PRACTITIONER

## 2018-06-14 RX ORDER — PEDIATRIC MULTIVITAMIN NO.17
1 TABLET,CHEWABLE ORAL DAILY
COMMUNITY
End: 2019-03-29

## 2018-06-14 RX ORDER — RANITIDINE HCL 75 MG
75 TABLET ORAL DAILY PRN
COMMUNITY
End: 2019-03-29

## 2018-06-14 NOTE — PROGRESS NOTES
Encounter Date:06/14/2018      Patient ID: Basia Schulte is a 28 y.o. female.        Subjective:     Chief Complaint: Establish Care; Rapid Heart Rate; and Chest Pain     History of Present Illness  Patient presents to the office today for ongoing evaluation of her new onset of tachycardia and elevated blood pressure. She notes that her normal heart rate is in the 80s but reported that her heart rate last night was 110s and that her blood pressure was 120/90s. She notes she was very symptomatic and reports fatigue, chest tightness, dizziness, lightheadedness, and near syncope. She notes that her normal heart rate is 70-80s and her normal blood pressure is in the 90s/50s.  She notes that she has been on the concerta for over a year.     Past Medical History:   Diagnosis Date   • ADD (attention deficit disorder) 2016    Orignally Dx as anxiety as kid   • ADHD (attention deficit hyperactivity disorder)    • History of kidney stones 2009   • PID (pelvic inflammatory disease) 2016    Tx after seen in ER - STD w/u was (-)   • Seizure disorder 2002   • Seizures    • Vitamin D deficiency        Past Surgical History:   Procedure Laterality Date   • CERVICAL CERCLAGE  2011   • CERVICAL CERCLAGE  04/2014   • COLONOSCOPY     • CYST REMOVAL     • D&C WITH SUCTION  2008   • D&C WITH SUCTION  08/2011   • EXTRACORPOREAL SHOCK WAVE LITHOTRIPSY (ESWL)  2009   • PERINEAL LESION/CYST EXCISION N/A 2/16/2018    Procedure: PERINEAL LESION/CYST EXCISION;  Surgeon: Jonh Davis MD;  Location: [facility];  Service:        No Known Allergies      Current Outpatient Prescriptions:   •  folic acid (FOLVITE) 1 MG tablet, Take 1 mg by mouth Daily., Disp: , Rfl: 10  •  guanFACINE (TENEX) 1 MG tablet, Take 0.5 tablets by mouth Every Night., Disp: 30 tablet, Rfl: 0  •  lamoTRIgine (LaMICtal) 150 MG tablet, 150 mg 2 (Two) Times a Day., Disp: , Rfl: 10  •  methylphenidate (CONCERTA) 18 MG CR tablet, Take 1 tablet by mouth Daily, Disp: 30 tablet,  Rfl: 0  •  Pediatric Multiple Vit-C-FA (MULTIVITAMIN CHILDRENS) chewable tablet, Chew 1 tablet Daily., Disp: , Rfl:   •  raNITIdine (ZANTAC) 75 MG tablet, Take 75 mg by mouth Daily As Needed., Disp: , Rfl:     The following portions of the chart were reviewed and updated as appropriate: Allergies, current medications, past family history, social history, past medical history.     Review of Systems   Constitution: Positive for malaise/fatigue. Negative for chills, decreased appetite, diaphoresis, fever, weakness, night sweats, weight gain and weight loss.   HENT: Negative for congestion, hearing loss, hoarse voice and nosebleeds.    Eyes: Negative for blurred vision, visual disturbance and visual halos.   Cardiovascular: Positive for chest pain, irregular heartbeat and near-syncope. Negative for claudication, cyanosis, dyspnea on exertion, leg swelling, orthopnea, palpitations, paroxysmal nocturnal dyspnea and syncope.   Respiratory: Negative for cough, hemoptysis, shortness of breath, sleep disturbances due to breathing, snoring, sputum production and wheezing.    Hematologic/Lymphatic: Negative for bleeding problem. Does not bruise/bleed easily.   Skin: Negative for dry skin, itching and rash.   Musculoskeletal: Negative for arthritis, joint pain, joint swelling and myalgias.   Gastrointestinal: Negative for bloating, abdominal pain, constipation, diarrhea, flatus, heartburn, hematemesis, hematochezia, melena, nausea and vomiting.   Genitourinary: Negative for dysuria, frequency, hematuria, nocturia and urgency.   Neurological: Positive for excessive daytime sleepiness, dizziness, light-headedness and loss of balance. Negative for headaches.   Psychiatric/Behavioral: Negative for depression. The patient has insomnia. The patient is not nervous/anxious.    Allergic/Immunologic:        Seasonal allergies           Objective:     Vitals:    06/14/18 0803 06/14/18 0804 06/14/18 0805   BP: 94/59 94/60 102/69   BP  "Location: Right arm Left arm Left arm   Patient Position: Sitting Sitting Standing   Cuff Size: Small Adult     Pulse: 62 63 71   Resp: 17     Temp: 97.8 °F (36.6 °C)     TempSrc: Temporal Artery      SpO2: 99%     Weight: 54.4 kg (120 lb)     Height: 160 cm (63\")           Physical Exam   Constitutional: She is oriented to person, place, and time. She appears well-developed and well-nourished. She is active and cooperative. No distress.   HENT:   Head: Normocephalic and atraumatic.   Mouth/Throat: Oropharynx is clear and moist.   Eyes: Conjunctivae and EOM are normal. Pupils are equal, round, and reactive to light.   Neck: Normal range of motion. Neck supple. No JVD present. No tracheal deviation present. No thyromegaly present.   Cardiovascular: Normal rate, regular rhythm, normal heart sounds and intact distal pulses.    Pulmonary/Chest: Effort normal and breath sounds normal.   Abdominal: Soft. Bowel sounds are normal. She exhibits no distension. There is no tenderness.   Musculoskeletal: Normal range of motion.   Neurological: She is alert and oriented to person, place, and time.   Skin: Skin is warm, dry and intact.   Psychiatric: She has a normal mood and affect. Her behavior is normal.   Nursing note and vitals reviewed.      Lab and Diagnostic Review:      Results for orders placed or performed in visit on 06/14/18   Thyroid Panel With TSH   Result Value Ref Range    TSH 1.612 0.350 - 5.350 mIU/mL    T3 Uptake 31.3 23.0 - 37.0 %    T4, Total 7.10 4.70 - 11.40 mcg/dL    Free Thyroxine Index 2.2 TBI   Basic Metabolic Panel   Result Value Ref Range    Glucose 73 70 - 100 mg/dL    BUN 13 9 - 23 mg/dL    Creatinine 0.69 0.60 - 1.30 mg/dL    Sodium 139 132 - 146 mmol/L    Potassium 3.9 3.5 - 5.5 mmol/L    Chloride 107 99 - 109 mmol/L    CO2 25.0 20.0 - 31.0 mmol/L    Calcium 9.2 8.7 - 10.4 mg/dL    eGFR Non African Amer 102 >60 mL/min/1.73    BUN/Creatinine Ratio 18.8 7.0 - 25.0    Anion Gap 7.0 3.0 - 11.0 mmol/L "   Magnesium   Result Value Ref Range    Magnesium 1.8 1.3 - 2.7 mg/dL   CBC Auto Differential   Result Value Ref Range    WBC 8.80 3.50 - 10.80 10*3/mm3    RBC 4.31 3.89 - 5.14 10*6/mm3    Hemoglobin 14.0 11.5 - 15.5 g/dL    Hematocrit 41.5 34.5 - 44.0 %    MCV 96.3 80.0 - 99.0 fL    MCH 32.5 (H) 27.0 - 31.0 pg    MCHC 33.7 32.0 - 36.0 g/dL    RDW 12.2 11.3 - 14.5 %    RDW-SD 43.2 37.0 - 54.0 fl    MPV 11.9 6.0 - 12.0 fL    Platelets 282 150 - 450 10*3/mm3    Neutrophil % 66.8 41.0 - 71.0 %    Lymphocyte % 23.0 (L) 24.0 - 44.0 %    Monocyte % 8.6 0.0 - 12.0 %    Eosinophil % 1.3 0.0 - 3.0 %    Basophil % 0.3 0.0 - 1.0 %    Immature Grans % 0.1 0.0 - 0.6 %    Neutrophils, Absolute 5.88 1.50 - 8.30 10*3/mm3    Lymphocytes, Absolute 2.02 0.60 - 4.80 10*3/mm3    Monocytes, Absolute 0.76 0.00 - 1.00 10*3/mm3    Eosinophils, Absolute 0.11 0.00 - 0.30 10*3/mm3    Basophils, Absolute 0.03 0.00 - 0.20 10*3/mm3    Immature Grans, Absolute 0.01 0.00 - 0.03 10*3/mm3   EKG: SB with sinus arrhythmia at 59 bpm      Assessment and Plan:         1. Tachycardia    - ECG 12 Lead; Future  - Holter Monitor - 72 Hour Up To 21 Days to rule out arrhythmia  - Thyroid Panel With TSH  - Basic Metabolic Panel  - CBC & Differential  - Magnesium      2. Chest tightness  Resolved with return of hr/bp to her normal range  Will continue to monitor  May consider echo if zio shows arrhthymias    F/u prn    It has been a pleasure to participate in the care of this patient.  Patient was instructed to call the [facility] with any questions, concerns, or worsening symptoms.        * Please note that portions of this note were completed with a voice recognition program. Efforts were made to edit the dictation but occasionally words are transcribed.

## 2018-06-15 ENCOUNTER — TELEPHONE (OUTPATIENT)
Dept: CARDIOLOGY | Facility: HOSPITAL | Age: 28
End: 2018-06-15

## 2018-06-15 NOTE — TELEPHONE ENCOUNTER
Reviewed lab results with patient via phone. Patient denies any symptoms today. Zio monitor in place. Patient instructed to call office with any change in symptoms or concerns.

## 2018-07-24 ENCOUNTER — TELEPHONE (OUTPATIENT)
Dept: CARDIOLOGY | Facility: HOSPITAL | Age: 28
End: 2018-07-24

## 2018-07-25 DIAGNOSIS — G40.909 SEIZURE DISORDER (HCC): ICD-10-CM

## 2018-07-25 DIAGNOSIS — F90.2 ADHD (ATTENTION DEFICIT HYPERACTIVITY DISORDER), COMBINED TYPE: ICD-10-CM

## 2018-07-25 RX ORDER — METHYLPHENIDATE HYDROCHLORIDE 18 MG/1
18 TABLET ORAL DAILY
Qty: 30 TABLET | Refills: 0 | Status: SHIPPED | OUTPATIENT
Start: 2018-07-25 | End: 2018-08-06 | Stop reason: SDUPTHER

## 2018-08-06 ENCOUNTER — OFFICE VISIT (OUTPATIENT)
Dept: PSYCHIATRY | Facility: CLINIC | Age: 28
End: 2018-08-06

## 2018-08-06 VITALS
DIASTOLIC BLOOD PRESSURE: 64 MMHG | HEIGHT: 63 IN | SYSTOLIC BLOOD PRESSURE: 97 MMHG | HEART RATE: 64 BPM | BODY MASS INDEX: 21.62 KG/M2 | WEIGHT: 122 LBS

## 2018-08-06 DIAGNOSIS — G40.909 SEIZURE DISORDER (HCC): ICD-10-CM

## 2018-08-06 DIAGNOSIS — F90.2 ADHD (ATTENTION DEFICIT HYPERACTIVITY DISORDER), COMBINED TYPE: Primary | ICD-10-CM

## 2018-08-06 PROCEDURE — 99214 OFFICE O/P EST MOD 30 MIN: CPT | Performed by: NURSE PRACTITIONER

## 2018-08-06 RX ORDER — GUANFACINE 1 MG/1
0.5 TABLET ORAL NIGHTLY
Qty: 30 TABLET | Refills: 1 | Status: SHIPPED | OUTPATIENT
Start: 2018-08-06 | End: 2018-10-01 | Stop reason: SDUPTHER

## 2018-08-06 RX ORDER — METHYLPHENIDATE HYDROCHLORIDE 18 MG/1
18 TABLET ORAL DAILY
Qty: 30 TABLET | Refills: 0 | Status: SHIPPED | OUTPATIENT
Start: 2018-08-06 | End: 2018-10-01 | Stop reason: SDUPTHER

## 2018-08-06 NOTE — PROGRESS NOTES
"Subjective   Basia Schulte is a 28 y.o. female who is here today for medication management follow up.    Chief Complaint:I've restarted on the medication for school.    HPI: History of Present Illness  The patient presents today for follow-up.  She does report that her symptoms are much improved with her medication.  She denies any significant attention or focus problems when she \"takes her medicine\".  Patient is sleeping well generally however she does report that last evening she stayed up too late \"reading\".  She denies any mood symptoms, thoughts of self harm or suicide.  Patient is denying any seizures.  Patient adamantly denies any mood symptoms she denies any thoughts to harm herself or others she denies any psychotic phenomenia.    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  She did have difficulty for 3 days with high blood pressure tachycardia-was through check by cardiaology without any findings.     The following portions of the patient's history were reviewed and updated as appropriate: allergies, current medications, past family history, past medical history, past social history, past surgical history and problem list.    Review of Systems  Patient denies any recent medical illnesses.  No acute cardiopulmonary symptoms evident.  No acute gastrointestinal complaints.  Patient's appetite and intake are adequate.  Patient's current weight compared with last weight.    Objective   Physical Exam  Blood pressure 97/64, pulse 64, height 160 cm (62.99\"), weight 55.3 kg (122 lb), not currently breastfeeding.    No Known Allergies    Current Medications:   Current Outpatient Prescriptions   Medication Sig Dispense Refill   • folic acid (FOLVITE) 1 MG tablet Take 1 mg by mouth Daily.  10   • guanFACINE (TENEX) 1 MG tablet Take 0.5 tablets by mouth Every Night. 30 tablet 0   • lamoTRIgine (LaMICtal) 150 MG tablet 150 mg 2 (Two) Times a Day.  10   • methylphenidate (CONCERTA) 18 " MG CR tablet Take 1 tablet by mouth Daily 30 tablet 0   • Pediatric Multiple Vit-C-FA (MULTIVITAMIN CHILDRENS) chewable tablet Chew 1 tablet Daily.     • raNITIdine (ZANTAC) 75 MG tablet Take 75 mg by mouth Daily As Needed.       No current facility-administered medications for this visit.        Mental Status Exam:   Hygiene:   fair  Cooperation:  Cooperative  Eye Contact:  Good  Psychomotor Behavior:  Appropriate  Affect:  Full range  Hopelessness: Denies  Speech:  Normal  Thought Process:  Goal directed and Linear   Thought Content:  Normal and Mood congurent  Suicidal:  None  Homicidal:  None  Hallucinations:  None  Delusion:  None  Memory:  Intact  Orientation:  Person, Place, Time and Situation  Reliability:  fair  Insight:  Fair  Judgement:  Fair  Impulse Control:  Fair  Physical/Medical Issues:  No     Assessment/Plan   Diagnoses and all orders for this visit:    ADHD (attention deficit hyperactivity disorder), combined type  -     methylphenidate (CONCERTA) 18 MG CR tablet; Take 1 tablet by mouth Daily  -     guanFACINE (TENEX) 1 MG tablet; Take 0.5 tablets by mouth Every Night.    Seizure disorder (CMS/HCC)  -     methylphenidate (CONCERTA) 18 MG CR tablet; Take 1 tablet by mouth Daily  -     guanFACINE (TENEX) 1 MG tablet; Take 0.5 tablets by mouth Every Night.              Errors in dictation may reflect use of voice recognition software and not all errors in transcription may have been detected prior to signing.Patient is being prescribed a controlled substance as part of treatment plan. Patient has been educated of appropriate use of the medications, including risk of somnolence, limited ability to drive and/or work safely, and potential for dependence, respiratory depression and overdose. Patient is also informed that the medication are to be used by the patient only- avoid any combined use of ETOH or other substances unless   Kyle report of past 12 months reviewed with no new issues- . Patient  reports taking medication as prescribed.  Patient denies any abuse/misuse of medication.  Patient denies any other substance use issues.  No apparent substance related issues.  Patient appropriate to continue with medication.  Reinforced risk of medication.  Patient was provided education regarding controlled substances contract was signed.  Patient consistently and adamantly denies any substance related problems.  We discussed risks, benefits, and side effects of the above medication and the patient was agreeable with the plan.    Return in about 2 months (around 10/6/2018).  The patient was instructed to call clinic as needed or go to ER if in crisis.  Patient was instructed to immediately call 911 or come to the nearest emergency room for thoughts to harm self or others.

## 2018-10-01 ENCOUNTER — OFFICE VISIT (OUTPATIENT)
Dept: PSYCHIATRY | Facility: CLINIC | Age: 28
End: 2018-10-01

## 2018-10-01 VITALS
HEIGHT: 62 IN | DIASTOLIC BLOOD PRESSURE: 64 MMHG | SYSTOLIC BLOOD PRESSURE: 96 MMHG | WEIGHT: 121.8 LBS | HEART RATE: 91 BPM | BODY MASS INDEX: 22.41 KG/M2

## 2018-10-01 DIAGNOSIS — G40.909 SEIZURE DISORDER (HCC): ICD-10-CM

## 2018-10-01 DIAGNOSIS — F90.2 ADHD (ATTENTION DEFICIT HYPERACTIVITY DISORDER), COMBINED TYPE: ICD-10-CM

## 2018-10-01 PROCEDURE — 99214 OFFICE O/P EST MOD 30 MIN: CPT | Performed by: NURSE PRACTITIONER

## 2018-10-01 RX ORDER — ONDANSETRON 4 MG/1
TABLET, FILM COATED ORAL
Refills: 0 | COMMUNITY
Start: 2018-09-12 | End: 2019-02-06

## 2018-10-01 RX ORDER — GUANFACINE 1 MG/1
0.5 TABLET ORAL NIGHTLY
Qty: 30 TABLET | Refills: 1 | Status: SHIPPED | OUTPATIENT
Start: 2018-10-01 | End: 2018-12-13 | Stop reason: SDUPTHER

## 2018-10-01 RX ORDER — METHYLPHENIDATE HYDROCHLORIDE 27 MG/1
27 TABLET ORAL DAILY
Qty: 30 TABLET | Refills: 0 | Status: SHIPPED | OUTPATIENT
Start: 2018-10-01 | End: 2018-11-05 | Stop reason: SDUPTHER

## 2018-10-01 NOTE — PROGRESS NOTES
"Subjective   Basia Schulte is a 28 y.o. female who is here today for medication management follow up.    Chief Complaint:  ADHD    HPI: History of Present Illness  The patient presents today for follow-up. Patient expressed concern regarding ongoing behaviors including difficulty organizing task, inattention to detail, frequent interruption into others converstation/task, difficulty complying with verbal instruction, forgetfulness, and organization.  The patient report medication compliance.  Patient appears well developed and weight is stable.   She does report that her symptoms are somewhat worsening in regards to attention-losing tract of conversation, disorganization.  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 She denies any severe depressive symptoms-she does have periods of sadness when she is bored and distracted, denies thoughts of self harm or suicide.  Patient is denying any seizures.  Patient adamantly denies any mood symptoms she denies any thoughts to harm herself or others she denies any psychotic phenomenia.    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    The following portions of the patient's history were reviewed and updated as appropriate: allergies, current medications, past family history, past medical history, past social history, past surgical history and problem list.    Review of Systems  Patient denies any recent medical illnesses.  No acute cardiopulmonary symptoms evident.  No acute gastrointestinal complaints.  Patient's appetite and intake are adequate.  Patient's current weight compared with last weight.    Objective   Physical Exam  Blood pressure 96/64, pulse 91, height 157.5 cm (62\"), weight 55.2 kg (121 lb 12.8 oz), not currently breastfeeding.    No Known Allergies    Current Medications:   Current Outpatient Prescriptions   Medication Sig Dispense Refill   • folic acid (FOLVITE) 1 MG tablet Take 1 mg by " mouth Daily.  10   • guanFACINE (TENEX) 1 MG tablet Take 0.5 tablets by mouth Every Night. 30 tablet 1   • lamoTRIgine (LaMICtal) 150 MG tablet 150 mg 2 (Two) Times a Day.  10   • methylphenidate (CONCERTA) 18 MG CR tablet Take 1 tablet by mouth Daily 30 tablet 0   • Pediatric Multiple Vit-C-FA (MULTIVITAMIN CHILDRENS) chewable tablet Chew 1 tablet Daily.     • raNITIdine (ZANTAC) 75 MG tablet Take 75 mg by mouth Daily As Needed.       No current facility-administered medications for this visit.        Mental Status Exam:   Hygiene:   fair  Cooperation:  Cooperative  Eye Contact:  Good  Psychomotor Behavior:  Appropriate  Affect:  Full range  Hopelessness: Denies  Speech:  Normal  Thought Process:  Goal directed and Linear   Thought Content:  Normal and Mood congurent  Suicidal:  None  Homicidal:  None  Hallucinations:  None  Delusion:  None  Memory:  Intact  Orientation:  Person, Place, Time and Situation  Reliability:  fair  Insight:  Fair  Judgement:  Fair  Impulse Control:  Fair  Physical/Medical Issues:  No     Assessment/Plan   Diagnoses and all orders for this visit:    ADHD (attention deficit hyperactivity disorder), combined type  -     methylphenidate (CONCERTA) 27 MG CR tablet; Take 1 tablet by mouth Daily  -     guanFACINE (TENEX) 1 MG tablet; Take 0.5 tablets by mouth Every Night.    Seizure disorder (CMS/HCC)  -     methylphenidate (CONCERTA) 27 MG CR tablet; Take 1 tablet by mouth Daily  -     guanFACINE (TENEX) 1 MG tablet; Take 0.5 tablets by mouth Every Night.              Errors in dictation may reflect use of voice recognition software and not all errors in transcription may have been detected prior to signing.Patient is being prescribed a controlled substance as part of treatment plan. Patient has been educated of appropriate use of the medications, including risk of somnolence, limited ability to drive and/or work safely, and potential for dependence, respiratory depression and overdose.  Patient is also informed that the medication are to be used by the patient only- avoid any combined use of ETOH or other substances unless   Kyle report of past 12 months reviewed with no new issues- . Patient reports taking medication as prescribed.  Patient denies any abuse/misuse of medication.  Patient denies any other substance use issues.  No apparent substance related issues.  Patient appropriate to continue with medication.  Reinforced risk of medication.  Patient was provided education regarding controlled substances contract was signed.  Patient consistently and adamantly denies any substance related problems.  We discussed risks, benefits, and side effects of the above medication and the patient was agreeable with the plan.    Return in about 2 months (around 12/1/2018).  The patient was instructed to call clinic as needed or go to ER if in crisis.  Patient was instructed to immediately call 911 or come to the nearest emergency room for thoughts to harm self or others.

## 2018-10-23 ENCOUNTER — TELEPHONE (OUTPATIENT)
Dept: OBSTETRICS AND GYNECOLOGY | Facility: CLINIC | Age: 28
End: 2018-10-23

## 2018-11-05 DIAGNOSIS — F90.2 ADHD (ATTENTION DEFICIT HYPERACTIVITY DISORDER), COMBINED TYPE: ICD-10-CM

## 2018-11-05 DIAGNOSIS — G40.909 SEIZURE DISORDER (HCC): ICD-10-CM

## 2018-11-05 RX ORDER — METHYLPHENIDATE HYDROCHLORIDE 27 MG/1
27 TABLET ORAL DAILY
Qty: 30 TABLET | Refills: 0 | Status: SHIPPED | OUTPATIENT
Start: 2018-11-05 | End: 2018-12-04 | Stop reason: SDUPTHER

## 2018-12-04 DIAGNOSIS — F90.2 ADHD (ATTENTION DEFICIT HYPERACTIVITY DISORDER), COMBINED TYPE: ICD-10-CM

## 2018-12-04 DIAGNOSIS — G40.909 SEIZURE DISORDER (HCC): ICD-10-CM

## 2018-12-04 RX ORDER — METHYLPHENIDATE HYDROCHLORIDE 27 MG/1
27 TABLET ORAL DAILY
Qty: 30 TABLET | Refills: 0 | Status: SHIPPED | OUTPATIENT
Start: 2018-12-04 | End: 2018-12-13 | Stop reason: SDUPTHER

## 2018-12-13 ENCOUNTER — OFFICE VISIT (OUTPATIENT)
Dept: PSYCHIATRY | Facility: CLINIC | Age: 28
End: 2018-12-13

## 2018-12-13 VITALS
SYSTOLIC BLOOD PRESSURE: 106 MMHG | DIASTOLIC BLOOD PRESSURE: 60 MMHG | WEIGHT: 125.4 LBS | BODY MASS INDEX: 23.08 KG/M2 | HEIGHT: 62 IN

## 2018-12-13 DIAGNOSIS — F43.0 PANIC ATTACK AS REACTION TO STRESS: ICD-10-CM

## 2018-12-13 DIAGNOSIS — G47.09 INITIAL INSOMNIA: ICD-10-CM

## 2018-12-13 DIAGNOSIS — F43.10 POST TRAUMATIC STRESS DISORDER (PTSD): ICD-10-CM

## 2018-12-13 DIAGNOSIS — F41.0 PANIC ATTACK AS REACTION TO STRESS: ICD-10-CM

## 2018-12-13 DIAGNOSIS — F90.2 ADHD (ATTENTION DEFICIT HYPERACTIVITY DISORDER), COMBINED TYPE: Primary | ICD-10-CM

## 2018-12-13 DIAGNOSIS — G40.909 SEIZURE DISORDER (HCC): ICD-10-CM

## 2018-12-13 DIAGNOSIS — Z79.899 MEDICATION MANAGEMENT: ICD-10-CM

## 2018-12-13 PROCEDURE — 90792 PSYCH DIAG EVAL W/MED SRVCS: CPT | Performed by: NURSE PRACTITIONER

## 2018-12-13 RX ORDER — HYDROXYZINE HYDROCHLORIDE 10 MG/1
10 TABLET, FILM COATED ORAL 2 TIMES DAILY PRN
Qty: 30 TABLET | Refills: 1 | Status: SHIPPED | OUTPATIENT
Start: 2018-12-13 | End: 2019-02-06 | Stop reason: SDUPTHER

## 2018-12-13 RX ORDER — HYDROXYZINE HYDROCHLORIDE 25 MG/1
25-50 TABLET, FILM COATED ORAL NIGHTLY PRN
Qty: 45 TABLET | Refills: 1 | Status: SHIPPED | OUTPATIENT
Start: 2018-12-13 | End: 2019-02-06

## 2018-12-13 RX ORDER — METHYLPHENIDATE HYDROCHLORIDE 36 MG/1
36 TABLET ORAL DAILY
Qty: 30 TABLET | Refills: 0 | Status: SHIPPED | OUTPATIENT
Start: 2018-12-13 | End: 2019-01-16 | Stop reason: SDUPTHER

## 2018-12-13 RX ORDER — GUANFACINE 1 MG/1
0.5 TABLET ORAL NIGHTLY
Qty: 30 TABLET | Refills: 1 | Status: SHIPPED | OUTPATIENT
Start: 2018-12-13 | End: 2019-02-06 | Stop reason: SDUPTHER

## 2018-12-13 NOTE — PROGRESS NOTES
Subjective   Basia Schulte is a 28 y.o. female who is here today for medication management. This is first visit between pt and provider as care was recently transferred from [facility].   Chief Complaint:  ADHD and Anxiety    HPI: History of Present Illness  The patient presents today for follow-up. 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 States there are so many spots and she is unsure about it all. 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 She reports 27mg Concerta was helpful but she still has ongoing difficulties with focus and concentration, hyperactivity, fidgety. She also reports symptoms of anxiety with excess worry about things she can't control. Sometimes she will have episodes of impending doom, heart pounding, palms become sweaty. She reports some improvement with her irritability/agitation/esparza regulation. She reports more naps during the day even on the stimulant. She then has difficulty initiating sleep at night. No issues maintaining sleep. Initially, guanfacine helped her sleep at night. Reports her appetite is good, tolerates diet. She has hx of epilepsy with last seizure 7 years ago. Denies any SI/HI/AH/VH.   No new medical concerns. She had abscess in her groin area from an ingrown hair. It had to be drained. It has resolved since having the I&D. Stressors including having 3 children,  in Master's program for business, and she plans to be in nursing school soon. Her oldest son, 7yo, is in counseling and taking classes to work on his social  Skills. It was considered her son be high functioning autistic and/or social anxiety disorder. She denies any significant depression, sharing about 2 months ago she had issues with some depressive symptoms. States 7 years ago her daughter passed away and states the symptoms resembled each other.        The following portions of the patient's history were reviewed and  "updated as appropriate: allergies, current medications, past family history, past medical history, past social history, past surgical history and problem list.    Review of Systems   Constitutional: Positive for fatigue. Negative for activity change and appetite change.   HENT: Negative.    Eyes: Negative for visual disturbance.   Respiratory: Negative.    Cardiovascular: Negative.    Gastrointestinal: Negative for nausea.   Endocrine: Negative.    Genitourinary: Negative.    Musculoskeletal: Negative for arthralgias.   Skin: Negative.    Allergic/Immunologic: Negative.    Neurological: Negative for dizziness, seizures and headaches.   Hematological: Negative.    Psychiatric/Behavioral: Positive for decreased concentration and sleep disturbance. Negative for agitation, behavioral problems, confusion, dysphoric mood, hallucinations, self-injury and suicidal ideas. The patient is nervous/anxious and is hyperactive.      Patient denies any recent medical illnesses.  No acute cardiopulmonary symptoms evident.  No acute gastrointestinal complaints.  Patient's appetite and intake are adequate.  Patient's current weight compared with last weight.    Objective   Physical Exam   Constitutional: She is oriented to person, place, and time. She appears well-developed and well-nourished. No distress.   Neurological: She is alert and oriented to person, place, and time.   Skin: She is not diaphoretic.   Vitals reviewed.    Blood pressure 106/60, height 157.5 cm (62\"), weight 56.9 kg (125 lb 6.4 oz), not currently breastfeeding.    No Known Allergies    Current Medications:   Current Outpatient Medications   Medication Sig Dispense Refill   • folic acid (FOLVITE) 1 MG tablet Take 1 mg by mouth Daily.  10   • guanFACINE (TENEX) 1 MG tablet Take 0.5 tablets by mouth Every Night. 30 tablet 1   • lamoTRIgine (LaMICtal) 150 MG tablet 150 mg 2 (Two) Times a Day.  10   • methylphenidate (CONCERTA) 36 MG CR tablet Take 1 tablet by mouth " Daily 30 tablet 0   • Pediatric Multiple Vit-C-FA (MULTIVITAMIN CHILDRENS) chewable tablet Chew 1 tablet Daily.     • raNITIdine (ZANTAC) 75 MG tablet Take 75 mg by mouth Daily As Needed.     • hydrOXYzine (ATARAX) 10 MG tablet Take 1 tablet by mouth 2 (Two) Times a Day As Needed for Anxiety. 30 tablet 1   • hydrOXYzine (ATARAX) 25 MG tablet Take 1-2 tablets by mouth At Night As Needed (sleep). 45 tablet 1   • ondansetron (ZOFRAN) 4 MG tablet   0     No current facility-administered medications for this visit.        Mental Status Exam:   Hygiene:   fair  Cooperation:  Cooperative  Eye Contact:  Good  Psychomotor Behavior:  Appropriate  Affect:  Full range  Hopelessness: Denies  Speech:  Normal  Thought Process:  Goal directed and Linear   Thought Content:  Normal and Mood congurent  Suicidal:  None  Homicidal:  None  Hallucinations:  None  Delusion:  None  Memory:  Intact  Orientation:  Person, Place, Time and Situation  Reliability:  fair  Insight:  Fair  Judgement:  Fair  Impulse Control:  Fair  Physical/Medical Issues:  No     Assessment/Plan   Diagnoses and all orders for this visit:    ADHD (attention deficit hyperactivity disorder), combined type  -     methylphenidate (CONCERTA) 36 MG CR tablet; Take 1 tablet by mouth Daily  -     guanFACINE (TENEX) 1 MG tablet; Take 0.5 tablets by mouth Every Night.    Panic attack as reaction to stress  -     hydrOXYzine (ATARAX) 10 MG tablet; Take 1 tablet by mouth 2 (Two) Times a Day As Needed for Anxiety.    Post traumatic stress disorder (PTSD)  -     hydrOXYzine (ATARAX) 10 MG tablet; Take 1 tablet by mouth 2 (Two) Times a Day As Needed for Anxiety.    Medication management    Seizure disorder (CMS/HCC)  -     methylphenidate (CONCERTA) 36 MG CR tablet; Take 1 tablet by mouth Daily  -     guanFACINE (TENEX) 1 MG tablet; Take 0.5 tablets by mouth Every Night.    Initial insomnia  -     hydrOXYzine (ATARAX) 25 MG tablet; Take 1-2 tablets by mouth At Night As Needed  (sleep).      Functionality: pt having significant impairment in important areas of daily functioning.  Body mass index is 22.94 kg/m².. patient was educated to eat healthier diet choices and encouraged exercise.  Prognosis: Guarded dependent on medication/follow up and treatment plan compliance.  Pt reports recently quitting smoking 143 days ago.       We will begin hydroxyzine for intermittent exacerbation of anxiety during the day. Will also trial on a higher dose at [facility] for initial insomnia. Continue tenex for impulsivity and increase Concerta to 36mg for ongoing ADHD symptoms.   Patient is being prescribed a controlled substance as part of treatment plan. Patient has been educated of appropriate use of the medications, including risk of somnolence, limited ability to drive and/or work safely, and potential for dependence, respiratory depression and overdose. Patient is also informed that the medication are to be used by the patient only- avoid any combined use of ETOH or other substances unless   Kyle report of past 12 months reviewed with no new issues- . Patient reports taking medication as prescribed.  Patient denies any abuse/misuse of medication.  Patient denies any other substance use issues.  No apparent substance related issues.  Patient appropriate to continue with medication.  Reinforced risk of medication.  Patient was provided education regarding controlled substances contract was signed.  Patient consistently and adamantly denies any substance related problems.  We discussed risks, benefits, and side effects of the above medication and the patient was agreeable with the plan.    Return in about 4 weeks (around 1/10/2019), or if symptoms worsen or fail to improve, for Recheck, Next scheduled follow up.  The patient was instructed to call clinic as needed or go to ER if in crisis.  Patient was instructed to immediately call 911 or come to the nearest emergency room for thoughts to harm self or  others.

## 2019-01-16 DIAGNOSIS — G40.909 SEIZURE DISORDER (HCC): ICD-10-CM

## 2019-01-16 DIAGNOSIS — F90.2 ADHD (ATTENTION DEFICIT HYPERACTIVITY DISORDER), COMBINED TYPE: ICD-10-CM

## 2019-01-16 RX ORDER — METHYLPHENIDATE HYDROCHLORIDE 36 MG/1
36 TABLET ORAL DAILY
Qty: 30 TABLET | Refills: 0 | Status: SHIPPED | OUTPATIENT
Start: 2019-01-16 | End: 2019-02-06 | Stop reason: SDUPTHER

## 2019-02-06 ENCOUNTER — OFFICE VISIT (OUTPATIENT)
Dept: PSYCHIATRY | Facility: CLINIC | Age: 29
End: 2019-02-06

## 2019-02-06 VITALS
HEIGHT: 62 IN | SYSTOLIC BLOOD PRESSURE: 103 MMHG | BODY MASS INDEX: 22.93 KG/M2 | WEIGHT: 124.6 LBS | DIASTOLIC BLOOD PRESSURE: 70 MMHG | HEART RATE: 86 BPM

## 2019-02-06 DIAGNOSIS — F90.2 ADHD (ATTENTION DEFICIT HYPERACTIVITY DISORDER), COMBINED TYPE: Primary | ICD-10-CM

## 2019-02-06 DIAGNOSIS — F43.10 POST TRAUMATIC STRESS DISORDER (PTSD): ICD-10-CM

## 2019-02-06 DIAGNOSIS — Z79.899 MEDICATION MANAGEMENT: ICD-10-CM

## 2019-02-06 DIAGNOSIS — G40.909 SEIZURE DISORDER (HCC): ICD-10-CM

## 2019-02-06 DIAGNOSIS — F43.0 PANIC ATTACK AS REACTION TO STRESS: ICD-10-CM

## 2019-02-06 DIAGNOSIS — F41.0 PANIC ATTACK AS REACTION TO STRESS: ICD-10-CM

## 2019-02-06 LAB
AMPHETAMINE CUT-OFF: NORMAL
BENZODIAZIPINE CUT-OFF: NORMAL
BUPRENORPHINE CUT-OFF: NORMAL
COCAINE CUT-OFF: NORMAL
EXTERNAL AMPHETAMINE SCREEN URINE: NEGATIVE
EXTERNAL BENZODIAZEPINE SCREEN URINE: NEGATIVE
EXTERNAL BUPRENORPHINE SCREEN URINE: NEGATIVE
EXTERNAL COCAINE SCREEN URINE: NEGATIVE
EXTERNAL MDMA: NEGATIVE
EXTERNAL METHADONE SCREEN URINE: NEGATIVE
EXTERNAL METHAMPHETAMINE SCREEN URINE: NEGATIVE
EXTERNAL OPIATES SCREEN URINE: NEGATIVE
EXTERNAL OXYCODONE SCREEN URINE: NEGATIVE
EXTERNAL THC SCREEN URINE: NEGATIVE
MDMA CUT-OFF: NORMAL
METHADONE CUT-OFF: NORMAL
METHAMPHETAMINE CUT-OFF: NORMAL
OPIATES CUT-OFF: NORMAL
OXYCODONE CUT-OFF: NORMAL
THC CUT-OFF: NORMAL

## 2019-02-06 PROCEDURE — 99214 OFFICE O/P EST MOD 30 MIN: CPT | Performed by: NURSE PRACTITIONER

## 2019-02-06 RX ORDER — METHYLPHENIDATE HYDROCHLORIDE 36 MG/1
36 TABLET ORAL DAILY
Qty: 30 TABLET | Refills: 0 | Status: SHIPPED | OUTPATIENT
Start: 2019-02-06 | End: 2019-03-19 | Stop reason: SDUPTHER

## 2019-02-06 RX ORDER — LAMOTRIGINE 150 MG/1
150 TABLET ORAL 2 TIMES DAILY
Qty: 180 TABLET | Refills: 0 | Status: SHIPPED | OUTPATIENT
Start: 2019-02-06 | End: 2019-07-19 | Stop reason: SDUPTHER

## 2019-02-06 RX ORDER — GUANFACINE 1 MG/1
0.5 TABLET ORAL NIGHTLY
Qty: 45 TABLET | Refills: 0 | Status: SHIPPED | OUTPATIENT
Start: 2019-02-06 | End: 2019-09-19 | Stop reason: SDUPTHER

## 2019-02-06 RX ORDER — HYDROXYZINE HYDROCHLORIDE 10 MG/1
10 TABLET, FILM COATED ORAL 2 TIMES DAILY PRN
Qty: 180 TABLET | Refills: 0 | Status: SHIPPED | OUTPATIENT
Start: 2019-02-06 | End: 2019-03-19 | Stop reason: SDUPTHER

## 2019-02-06 NOTE — PROGRESS NOTES
Subjective   Basia Schulte is a 28 y.o. female who is here today for medication management.     Chief Complaint:  ADHD and Anxiety    HPI: History of Present Illness  The patient presents today for follow-up. 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 She is taking Lamictal and Concerta as prescribed, denies SE. Guanfacine helps her relax. Appetite is good, tolerates diet, maintaining weight. No new medical concerns, stressors. States one of her son's teachers is stressing her out due to son being careless and placing blame on pt. Her children are doing well, adjusting to changes in the household. Her  is supportive. Denies any significant depression. She has anxiety that is expected in her situation, but is in more control of her symptoms and has better awareness such as sitting still, less fidgeting. No seizures. She is seeing a neurologist soon. 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 No SI/HI/AH/VH..        The following portions of the patient's history were reviewed and updated as appropriate: allergies, current medications, past family history, past medical history, past social history, past surgical history and problem list.    Review of Systems   Constitutional: Positive for fatigue. Negative for activity change and appetite change.   HENT: Negative.    Eyes: Negative for visual disturbance.   Respiratory: Negative.    Cardiovascular: Negative.    Gastrointestinal: Negative for nausea.   Endocrine: Negative.    Genitourinary: Negative.    Musculoskeletal: Negative for arthralgias.   Skin: Negative.    Allergic/Immunologic: Negative.    Neurological: Negative for dizziness, seizures and headaches.   Hematological: Negative.    Psychiatric/Behavioral: Negative for agitation, behavioral problems, confusion, decreased concentration, dysphoric mood, hallucinations, self-injury, sleep disturbance  "and suicidal ideas. The patient is nervous/anxious. The patient is not hyperactive.      Patient denies any recent medical illnesses.  No acute cardiopulmonary symptoms evident.  No acute gastrointestinal complaints.  Patient's appetite and intake are adequate.  Patient's current weight compared with last weight.    Objective   Physical Exam   Constitutional: She is oriented to person, place, and time. She appears well-developed and well-nourished. No distress.   Neurological: She is alert and oriented to person, place, and time.   Skin: She is not diaphoretic.   Vitals reviewed.    Blood pressure 103/70, pulse 86, height 157.5 cm (62\"), weight 56.5 kg (124 lb 9.6 oz), not currently breastfeeding.    No Known Allergies    Current Medications:   Current Outpatient Medications   Medication Sig Dispense Refill   • folic acid (FOLVITE) 1 MG tablet Take 1 mg by mouth Daily.  10   • guanFACINE (TENEX) 1 MG tablet Take 0.5 tablets by mouth Every Night. 45 tablet 0   • hydrOXYzine (ATARAX) 10 MG tablet Take 1 tablet by mouth 2 (Two) Times a Day As Needed for Anxiety. 180 tablet 0   • lamoTRIgine (LaMICtal) 150 MG tablet Take 1 tablet by mouth 2 (Two) Times a Day. 180 tablet 0   • methylphenidate (CONCERTA) 36 MG CR tablet Take 1 tablet by mouth Daily 30 tablet 0   • Pediatric Multiple Vit-C-FA (MULTIVITAMIN CHILDRENS) chewable tablet Chew 1 tablet Daily.     • raNITIdine (ZANTAC) 75 MG tablet Take 75 mg by mouth Daily As Needed.       No current facility-administered medications for this visit.        Mental Status Exam:   Hygiene:   fair  Cooperation:  Cooperative  Eye Contact:  Good  Psychomotor Behavior:  Appropriate  Affect:  Full range  Hopelessness: Denies  Speech:  Normal  Thought Process:  Goal directed and Linear   Thought Content:  Normal and Mood congurent  Suicidal:  None  Homicidal:  None  Hallucinations:  None  Delusion:  None  Memory:  Intact  Orientation:  Person, Place, Time and Situation  Reliability:  " fair  Insight:  Fair  Judgement:  Fair  Impulse Control:  Fair  Physical/Medical Issues:  No     Assessment/Plan   Diagnoses and all orders for this visit:    ADHD (attention deficit hyperactivity disorder), combined type  -     guanFACINE (TENEX) 1 MG tablet; Take 0.5 tablets by mouth Every Night.  -     methylphenidate (CONCERTA) 36 MG CR tablet; Take 1 tablet by mouth Daily    Seizure disorder (CMS/HCC)  -     guanFACINE (TENEX) 1 MG tablet; Take 0.5 tablets by mouth Every Night.  -     methylphenidate (CONCERTA) 36 MG CR tablet; Take 1 tablet by mouth Daily    Panic attack as reaction to stress  -     hydrOXYzine (ATARAX) 10 MG tablet; Take 1 tablet by mouth 2 (Two) Times a Day As Needed for Anxiety.  -     lamoTRIgine (LaMICtal) 150 MG tablet; Take 1 tablet by mouth 2 (Two) Times a Day.    Post traumatic stress disorder (PTSD)  -     hydrOXYzine (ATARAX) 10 MG tablet; Take 1 tablet by mouth 2 (Two) Times a Day As Needed for Anxiety.  -     lamoTRIgine (LaMICtal) 150 MG tablet; Take 1 tablet by mouth 2 (Two) Times a Day.    Medication management  -     [facility] Drug Screen    UDS AND DELBERT REVIEWED, COMPLIANT  Functionality: pt having improvements in important areas of daily functioning.  Body mass index is 22.79 kg/m².. patient was educated to eat healthier diet choices and encouraged exercise.  Prognosis: Guarded dependent on medication/follow up and treatment plan compliance.  Pt reports recently quitting smoking and maintaining.   Continue hydroxyzine as needed for anxiety or sleep.  Continue Tenex for ADHD and Concerta for ADHD symptoms as well.  We will begin hydroxyzine for intermittent exacerbation of anxiety during the day. Will also trial on a higher dose at [facility] for initial insomnia. Continue tenex for impulsivity and increase Concerta to 36mg for ongoing ADHD symptoms.    Patient has been educated of appropriate use of the medications, including risk of somnolence, limited ability to drive and/or work  safely, and potential for dependence, respiratory depression and overdose. Patient is also informed that the medication are to be used by the patient only- avoid any combined use of ETOH or other substances unless   Kyle report of past 12 months reviewed with no new issues- . Patient reports taking medication as prescribed.  Patient denies any abuse/misuse of medication.  Patient denies any other substance use issues.  No apparent substance related issues.  Patient appropriate to continue with medication.  Reinforced risk of medication.  Patient was provided education regarding controlled substances contract was signed.  Patient consistently and adamantly denies any substance related problems.  We discussed risks, benefits, and side effects of the above medication and the patient was agreeable with the plan.  25 min spent face to face with pt  Return in about 3 months (around 5/6/2019), or if symptoms worsen or fail to improve, for Recheck, Next scheduled follow up.  The patient was instructed to call clinic as needed or go to ER if in crisis.  Patient was instructed to immediately call 911 or come to the nearest emergency room for thoughts to harm self or others.

## 2019-03-14 DIAGNOSIS — F43.10 POST TRAUMATIC STRESS DISORDER (PTSD): ICD-10-CM

## 2019-03-14 DIAGNOSIS — F43.0 PANIC ATTACK AS REACTION TO STRESS: ICD-10-CM

## 2019-03-14 DIAGNOSIS — F41.0 PANIC ATTACK AS REACTION TO STRESS: ICD-10-CM

## 2019-03-14 RX ORDER — HYDROXYZINE HYDROCHLORIDE 10 MG/1
TABLET, FILM COATED ORAL
Qty: 60 TABLET | Refills: 0 | Status: CANCELLED | OUTPATIENT
Start: 2019-03-14

## 2019-03-19 DIAGNOSIS — F41.0 PANIC ATTACK AS REACTION TO STRESS: ICD-10-CM

## 2019-03-19 DIAGNOSIS — G40.909 SEIZURE DISORDER (HCC): ICD-10-CM

## 2019-03-19 DIAGNOSIS — F43.10 POST TRAUMATIC STRESS DISORDER (PTSD): ICD-10-CM

## 2019-03-19 DIAGNOSIS — F90.2 ADHD (ATTENTION DEFICIT HYPERACTIVITY DISORDER), COMBINED TYPE: ICD-10-CM

## 2019-03-19 DIAGNOSIS — F43.0 PANIC ATTACK AS REACTION TO STRESS: ICD-10-CM

## 2019-03-19 RX ORDER — HYDROXYZINE HYDROCHLORIDE 10 MG/1
10 TABLET, FILM COATED ORAL 2 TIMES DAILY PRN
Qty: 180 TABLET | Refills: 0 | Status: SHIPPED | OUTPATIENT
Start: 2019-03-19 | End: 2020-02-10 | Stop reason: SDUPTHER

## 2019-03-19 RX ORDER — METHYLPHENIDATE HYDROCHLORIDE 36 MG/1
36 TABLET ORAL DAILY
Qty: 30 TABLET | Refills: 0 | Status: SHIPPED | OUTPATIENT
Start: 2019-03-19 | End: 2019-04-22 | Stop reason: SDUPTHER

## 2019-03-29 ENCOUNTER — OFFICE VISIT (OUTPATIENT)
Dept: OBSTETRICS AND GYNECOLOGY | Facility: CLINIC | Age: 29
End: 2019-03-29

## 2019-03-29 VITALS — BODY MASS INDEX: 22.13 KG/M2 | DIASTOLIC BLOOD PRESSURE: 58 MMHG | WEIGHT: 121 LBS | SYSTOLIC BLOOD PRESSURE: 102 MMHG

## 2019-03-29 DIAGNOSIS — Z01.419 WELL WOMAN EXAM: Primary | ICD-10-CM

## 2019-03-29 PROBLEM — F17.200 SMOKER: Status: RESOLVED | Noted: 2017-08-11 | Resolved: 2019-03-29

## 2019-03-29 PROCEDURE — 99395 PREV VISIT EST AGE 18-39: CPT | Performed by: OBSTETRICS & GYNECOLOGY

## 2019-03-29 RX ORDER — MEDROXYPROGESTERONE ACETATE 150 MG/ML
150 INJECTION, SUSPENSION INTRAMUSCULAR
Qty: 1 EACH | Refills: 4 | Status: SHIPPED | OUTPATIENT
Start: 2019-03-29 | End: 2020-02-10

## 2019-03-29 NOTE — PROGRESS NOTES
Subjective   Chief Complaint   Patient presents with   • Gynecologic Exam     annual exam, denies c/o's. Desires to restart Depo     Basia Schulte is a 29 y.o. year old  presenting to be seen for her annual exam.     SEXUAL Hx:  She is currently sexually active.  In the past year there there has been NO new sexual partners.    Condoms are used intermittently.  She would not like to be screened for STD's at today's exam.  Current birth control method: condoms.  She is not happy with her current method of contraception and does want to discuss alternative methods of contraception.  MENSTRUAL Hx:  Patient's last menstrual period was 2019.  In the past 6 months her cycles have been regular, predictable and occur monthly.  Her menstrual flow is typically normal.   Each month on average there are roughly 1 day(s) of very heavy flow.    Intermenstrual bleeding is absent.    Post-coital bleeding is absent.  Dysmenorrhea: is not affecting her activities of daily living  PMS: is not affecting her activities of daily living  Her cycles are not a source of concern for her that she wishes to discuss today.  HEALTH Hx:  She exercises regularly: yes.  She wears her seat belt: yes.  She has concerns about domestic violence: no.  OTHER THINGS SHE WANTS TO DISCUSS TODAY:  Nothing else    The following portions of the patient's history were reviewed and updated as appropriate:problem list, current medications, allergies, past family history, past medical history, past social history and past surgical history.    Social History    Tobacco Use      Smoking status: Former Smoker        Packs/day: 0.50        Years: 10.00        Pack years: 5        Types: Cigarettes        Start date:         Quit date: 2018        Years since quittin.1      Smokeless tobacco: Never Used    Review of Systems  Constitutional POS: nothing reported    NEG: anorexia or night sweats   Genitourinary POS: nothing reported    NEG:  dysuria or hematuria      Gastointestinal POS: nothing reported    NEG: bloating, change in bowel habits, melena or reflux symptoms   Integument POS: nothing reported    NEG: moles that are changing in size, shape, color or rashes   Breast POS: nothing reported    NEG: persistent breast lump, skin dimpling or nipple discharge        Objective   /58 (Patient Position: Sitting)   Wt 54.9 kg (121 lb)   LMP 03/23/2019   BMI 22.13 kg/m²     General:  well developed; well nourished  no acute distress   Skin:  No suspicious lesions seen   Thyroid: normal to inspection and palpation   Breasts:  Examined in supine position  Symmetric without masses or skin dimpling  Nipples normal without inversion, lesions or discharge  There are no palpable axillary nodes   Abdomen: soft, non-tender; no masses  no umbilical or inguinal hernias are present  no hepato-splenomegaly   Pelvis: Clinical staff was present for exam  External genitalia:  normal appearance of the external genitalia including Bartholin's and [place]'s glands.  :  urethral meatus normal;  Vaginal:  normal pink mucosa without prolapse or lesions.  Cervix:  normal appearance.  Uterus:  normal size, shape and consistency.  Adnexa:  normal bimanual exam of the adnexa.  Rectal:  digital rectal exam not performed; anus visually normal appearing.        Assessment   1. Normal GYN exam  2. Desires to start Depo-provera     Plan   1. Pap was done today.  If she does not receive the results of the Pap within 2 weeks  time, she was instructed to call to find out the results.  I explained to Basia that the recommendations for Pap smear interval in a low risk patient's has lengthened to 3 years time.  I encouraged her to be seen yearly for a full physical exam including breast and pelvic exam even during the off years when PAP's will not be performed.  2. The importance of keeping all planned follow-up and taking all medications as prescribed was emphasized.  3. Follow up  for annual exam 1 year    New Medications Ordered This Visit   Medications   • medroxyPROGESTERone (DEPO-PROVERA) 150 MG/ML injection     Sig: Inject 1 mL into the appropriate muscle as directed by prescriber Every 3 (Three) Months.     Dispense:  1 each     Refill:  4          This note was electronically signed.    Wilver Rivera M.D.  March 29, 2019    Note: Speech recognition transcription software may have been used to create portions of this document.  An attempt at proofreading has been made but errors in transcription could still be present.

## 2019-04-22 ENCOUNTER — PRIOR AUTHORIZATION (OUTPATIENT)
Dept: PSYCHIATRY | Facility: CLINIC | Age: 29
End: 2019-04-22

## 2019-04-22 DIAGNOSIS — F90.2 ADHD (ATTENTION DEFICIT HYPERACTIVITY DISORDER), COMBINED TYPE: ICD-10-CM

## 2019-04-22 DIAGNOSIS — G40.909 SEIZURE DISORDER (HCC): ICD-10-CM

## 2019-04-22 RX ORDER — METHYLPHENIDATE HYDROCHLORIDE 36 MG/1
36 TABLET ORAL DAILY
Qty: 30 TABLET | Refills: 0 | Status: SHIPPED | OUTPATIENT
Start: 2019-04-22 | End: 2019-05-20 | Stop reason: SDUPTHER

## 2019-05-20 DIAGNOSIS — F90.2 ADHD (ATTENTION DEFICIT HYPERACTIVITY DISORDER), COMBINED TYPE: ICD-10-CM

## 2019-05-20 DIAGNOSIS — G40.909 SEIZURE DISORDER (HCC): ICD-10-CM

## 2019-05-20 RX ORDER — METHYLPHENIDATE HYDROCHLORIDE 36 MG/1
36 TABLET ORAL DAILY
Qty: 30 TABLET | Refills: 0 | Status: SHIPPED | OUTPATIENT
Start: 2019-05-20 | End: 2019-06-20 | Stop reason: SDUPTHER

## 2019-06-20 DIAGNOSIS — F90.2 ADHD (ATTENTION DEFICIT HYPERACTIVITY DISORDER), COMBINED TYPE: ICD-10-CM

## 2019-06-20 DIAGNOSIS — G40.909 SEIZURE DISORDER (HCC): ICD-10-CM

## 2019-06-20 RX ORDER — METHYLPHENIDATE HYDROCHLORIDE 36 MG/1
36 TABLET ORAL DAILY
Qty: 30 TABLET | Refills: 0 | Status: SHIPPED | OUTPATIENT
Start: 2019-06-20 | End: 2019-07-23 | Stop reason: SDUPTHER

## 2019-07-19 DIAGNOSIS — F43.0 PANIC ATTACK AS REACTION TO STRESS: ICD-10-CM

## 2019-07-19 DIAGNOSIS — F43.10 POST TRAUMATIC STRESS DISORDER (PTSD): ICD-10-CM

## 2019-07-19 DIAGNOSIS — F41.0 PANIC ATTACK AS REACTION TO STRESS: ICD-10-CM

## 2019-07-21 RX ORDER — LAMOTRIGINE 150 MG/1
TABLET ORAL
Qty: 60 TABLET | Refills: 0 | Status: SHIPPED | OUTPATIENT
Start: 2019-07-21 | End: 2019-09-19 | Stop reason: SDUPTHER

## 2019-07-23 DIAGNOSIS — F90.2 ADHD (ATTENTION DEFICIT HYPERACTIVITY DISORDER), COMBINED TYPE: ICD-10-CM

## 2019-07-23 DIAGNOSIS — G40.909 SEIZURE DISORDER (HCC): ICD-10-CM

## 2019-07-23 RX ORDER — METHYLPHENIDATE HYDROCHLORIDE 36 MG/1
36 TABLET ORAL DAILY
Qty: 30 TABLET | Refills: 0 | Status: SHIPPED | OUTPATIENT
Start: 2019-07-23 | End: 2019-08-22 | Stop reason: SDUPTHER

## 2019-08-22 DIAGNOSIS — G40.909 SEIZURE DISORDER (HCC): ICD-10-CM

## 2019-08-22 DIAGNOSIS — F90.2 ADHD (ATTENTION DEFICIT HYPERACTIVITY DISORDER), COMBINED TYPE: ICD-10-CM

## 2019-08-26 RX ORDER — METHYLPHENIDATE HYDROCHLORIDE 36 MG/1
36 TABLET ORAL DAILY
Qty: 30 TABLET | Refills: 0 | Status: SHIPPED | OUTPATIENT
Start: 2019-08-26 | End: 2019-09-19 | Stop reason: SDUPTHER

## 2019-08-27 ENCOUNTER — PRIOR AUTHORIZATION (OUTPATIENT)
Dept: PSYCHIATRY | Facility: CLINIC | Age: 29
End: 2019-08-27

## 2019-09-19 ENCOUNTER — OFFICE VISIT (OUTPATIENT)
Dept: PSYCHIATRY | Facility: CLINIC | Age: 29
End: 2019-09-19

## 2019-09-19 VITALS
SYSTOLIC BLOOD PRESSURE: 100 MMHG | DIASTOLIC BLOOD PRESSURE: 61 MMHG | HEART RATE: 86 BPM | WEIGHT: 129.4 LBS | HEIGHT: 62 IN | BODY MASS INDEX: 23.81 KG/M2

## 2019-09-19 DIAGNOSIS — F90.2 ADHD (ATTENTION DEFICIT HYPERACTIVITY DISORDER), COMBINED TYPE: ICD-10-CM

## 2019-09-19 DIAGNOSIS — F43.0 PANIC ATTACK AS REACTION TO STRESS: Primary | ICD-10-CM

## 2019-09-19 DIAGNOSIS — G40.909 SEIZURE DISORDER (HCC): ICD-10-CM

## 2019-09-19 DIAGNOSIS — F43.10 POST TRAUMATIC STRESS DISORDER (PTSD): ICD-10-CM

## 2019-09-19 DIAGNOSIS — Z79.899 MEDICATION MANAGEMENT: ICD-10-CM

## 2019-09-19 DIAGNOSIS — F41.0 PANIC ATTACK AS REACTION TO STRESS: Primary | ICD-10-CM

## 2019-09-19 PROCEDURE — 90792 PSYCH DIAG EVAL W/MED SRVCS: CPT | Performed by: NURSE PRACTITIONER

## 2019-09-19 RX ORDER — METHYLPHENIDATE HYDROCHLORIDE 36 MG/1
36 TABLET ORAL DAILY
Qty: 30 TABLET | Refills: 0 | Status: SHIPPED | OUTPATIENT
Start: 2019-09-19 | End: 2019-10-24 | Stop reason: SDUPTHER

## 2019-09-19 RX ORDER — LAMOTRIGINE 150 MG/1
150 TABLET ORAL 2 TIMES DAILY
Qty: 60 TABLET | Refills: 2 | Status: SHIPPED | OUTPATIENT
Start: 2019-09-19 | End: 2020-01-14 | Stop reason: SDUPTHER

## 2019-09-19 RX ORDER — GUANFACINE 1 MG/1
0.5 TABLET ORAL NIGHTLY
Qty: 45 TABLET | Refills: 2 | Status: SHIPPED | OUTPATIENT
Start: 2019-09-19 | End: 2020-01-14 | Stop reason: SDUPTHER

## 2019-09-19 NOTE — PROGRESS NOTES
Subjective   Basia Schulte is a 29 y.o. female who is here today for medication management.     Chief Complaint:  ADHD and Anxiety    HPI: History of Present Illness      The patient presents today for follow-up. 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 States she takes hydroxyzine to help her sleep at night but if she takes 4 or 5 days in a row it dries her up and makes it hard for her to breathe. She states she is drinking Propel electrolyte drink regularly but also drinking Mt. Dew at least 2 daily. She is taking her meds as prescribed, denies any other SE, efficacy noted. Appetite is good, tolerating diet. She has 8 lb weight gain over the past 6 months but states this is adverse effect of the BC method of Depo injection. She states she doesn't have an increased appetite nor does she notice eating more than usual over the past 6 months. She reports her ADHD symptoms are stable with Concerta.   She has anxiety that is expected in her situation, but is in more control of her symptoms and has better awareness such as sitting still, less fidgeting.  Her  is supportive. Denies any significant depression.   Hx of seizure disorder with last seizure appx 8 years ago.    No other medical concerns, stressors.   No SI/HI/AH/VH.     The following portions of the patient's history were reviewed and updated as appropriate: allergies, current medications, past family history, past medical history, past social history, past surgical history and problem list.  Past Medical History:   Diagnosis Date   • ADD (attention deficit disorder) 2016    Orignally Dx as anxiety as kid   • History of kidney stones 2009   • PID (pelvic inflammatory disease) 2016    Tx after seen in ER - STD w/u was (-)   • Seizure disorder (CMS/[facility]) 2002   • Vitamin D deficiency      Social History     Socioeconomic History   • Marital status:      Spouse name: Not on file   • Number of children: Not on file   • Years of  education: Not on file   • Highest education level: Not on file   Tobacco Use   • Smoking status: Former Smoker     Packs/day: 0.50     Years: 10.00     Pack years: 5.00     Types: Cigarettes     Start date:      Last attempt to quit: 2018     Years since quittin.6   • Smokeless tobacco: Never Used   Substance and Sexual Activity   • Alcohol use: No   • Drug use: No   • Sexual activity: Yes     Partners: Male     Birth control/protection: None   Social History Narrative    Caffeine:1-4  servings per day     Family History   Problem Relation Age of Onset   • Breast cancer Paternal Grandmother    • Diabetes Paternal Grandmother    • Ovarian cancer Maternal Aunt    • Anxiety disorder Mother    • Depression Mother    • Anxiety disorder Father    • Depression Father    • Obesity Sister    • No Known Problems Brother    • Diabetes Maternal Grandmother    • Heart disease Maternal Grandmother    • Heart attack Maternal Grandmother    • Emphysema Maternal Grandfather    • No Known Problems Paternal Grandfather        Review of Systems   Constitutional: Negative for activity change, appetite change and fatigue.   HENT: Negative.    Eyes: Negative for visual disturbance.   Respiratory: Negative.    Cardiovascular: Negative.    Gastrointestinal: Negative for nausea.   Endocrine: Negative.    Genitourinary: Negative.    Musculoskeletal: Negative for arthralgias.   Skin: Negative.    Allergic/Immunologic: Negative.    Neurological: Negative for dizziness, seizures and headaches.   Hematological: Negative.    Psychiatric/Behavioral: Negative for agitation, behavioral problems, confusion, decreased concentration, dysphoric mood, hallucinations, self-injury, sleep disturbance and suicidal ideas. The patient is not nervous/anxious and is not hyperactive.      Patient denies any recent medical illnesses.  No acute cardiopulmonary symptoms evident.  No acute gastrointestinal complaints.  Patient's appetite and intake are  "adequate.  Patient's current weight compared with last weight.    Objective   Physical Exam   Constitutional: She is oriented to person, place, and time. She appears well-developed and well-nourished. No distress.   Neurological: She is alert and oriented to person, place, and time.   Skin: She is not diaphoretic.   Vitals reviewed.    Blood pressure 100/61, pulse 86, height 157.5 cm (62\"), weight 58.7 kg (129 lb 6.4 oz), not currently breastfeeding.    No Known Allergies    Current Medications:   Current Outpatient Medications   Medication Sig Dispense Refill   • folic acid (FOLVITE) 1 MG tablet Take 1 mg by mouth Daily.  10   • guanFACINE (TENEX) 1 MG tablet Take 0.5 tablets by mouth Every Night. 45 tablet 2   • hydrOXYzine (ATARAX) 10 MG tablet Take 1 tablet by mouth 2 (Two) Times a Day As Needed for Anxiety. 180 tablet 0   • lamoTRIgine (LaMICtal) 150 MG tablet Take 1 tablet by mouth 2 (Two) Times a Day. 60 tablet 2   • medroxyPROGESTERone (DEPO-PROVERA) 150 MG/ML injection Inject 1 mL into the appropriate muscle as directed by prescriber Every 3 (Three) Months. 1 each 4   • methylphenidate (CONCERTA) 36 MG CR tablet Take 1 tablet by mouth Daily 30 tablet 0     No current facility-administered medications for this visit.        Mental Status Exam:   Hygiene:   fair  Cooperation:  Cooperative  Eye Contact:  Good  Psychomotor Behavior:  Appropriate  Affect:  Full range  Hopelessness: Denies  Speech:  Normal  Thought Process:  Goal directed and Linear   Thought Content:  Normal and Mood congurent  Suicidal:  None  Homicidal:  None  Hallucinations:  None  Delusion:  None  Memory:  Intact  Orientation:  Person, Place, Time and Situation  Reliability:  fair  Insight:  Fair  Judgement:  Fair  Impulse Control:  Fair  Physical/Medical Issues:  No     Assessment/Plan   Diagnoses and all orders for this visit:    Panic attack as reaction to stress  -     lamoTRIgine (LaMICtal) 150 MG tablet; Take 1 tablet by mouth 2 (Two) " Times a Day.    ADHD (attention deficit hyperactivity disorder), combined type  -     methylphenidate (CONCERTA) 36 MG CR tablet; Take 1 tablet by mouth Daily  -     guanFACINE (TENEX) 1 MG tablet; Take 0.5 tablets by mouth Every Night.    Post traumatic stress disorder (PTSD)  -     lamoTRIgine (LaMICtal) 150 MG tablet; Take 1 tablet by mouth 2 (Two) Times a Day.    Seizure disorder (CMS/HCC)  -     methylphenidate (CONCERTA) 36 MG CR tablet; Take 1 tablet by mouth Daily  -     guanFACINE (TENEX) 1 MG tablet; Take 0.5 tablets by mouth Every Night.    Medication management  -     [facility] Drug Screen    UDS AND DELBERT REVIEWED, COMPLIANT  Functionality: pt having improvements in important areas of daily functioning.  Body mass index is 23.67 kg/m².. patient was educated to eat healthier diet choices and encouraged exercise.  Prognosis: Guarded dependent on medication/follow up and treatment plan compliance.  Pt reports recently quitting smoking and maintaining.   Continue current meds as prescribed.    Patient has been educated of appropriate use of the medications, including risk of somnolence, limited ability to drive and/or work safely, and potential for dependence, respiratory depression and overdose. Patient is also informed that the medication are to be used by the patient only- avoid any combined use of ETOH or other substances unless   Delbert report of past 12 months reviewed with no new issues- . Patient reports taking medication as prescribed.  Patient denies any abuse/misuse of medication.  Patient denies any other substance use issues.  No apparent substance related issues.  Patient appropriate to continue with medication.  Reinforced risk of medication.  Patient was provided education regarding controlled substances contract was signed.  Patient consistently and adamantly denies any substance related problems.  We discussed risks, benefits, and side effects of the above medication and the patient was  agreeable with the plan.  She continues to be tobacco free over 400 days.   Return in about 3 months (around 12/19/2019), or if symptoms worsen or fail to improve, for Recheck, Next scheduled follow up.  The patient was instructed to call clinic as needed or go to ER if in crisis.  Patient was instructed to immediately call 911 or come to the nearest emergency room for thoughts to harm self or others.

## 2019-10-24 DIAGNOSIS — G40.909 SEIZURE DISORDER (HCC): ICD-10-CM

## 2019-10-24 DIAGNOSIS — F90.2 ADHD (ATTENTION DEFICIT HYPERACTIVITY DISORDER), COMBINED TYPE: ICD-10-CM

## 2019-10-24 RX ORDER — METHYLPHENIDATE HYDROCHLORIDE 36 MG/1
36 TABLET ORAL DAILY
Qty: 30 TABLET | Refills: 0 | Status: SHIPPED | OUTPATIENT
Start: 2019-10-24 | End: 2019-11-21 | Stop reason: SDUPTHER

## 2019-10-28 DIAGNOSIS — F43.10 POST TRAUMATIC STRESS DISORDER (PTSD): ICD-10-CM

## 2019-10-28 DIAGNOSIS — F41.0 PANIC ATTACK AS REACTION TO STRESS: ICD-10-CM

## 2019-10-28 DIAGNOSIS — F43.0 PANIC ATTACK AS REACTION TO STRESS: ICD-10-CM

## 2019-10-29 RX ORDER — HYDROXYZINE HYDROCHLORIDE 10 MG/1
TABLET, FILM COATED ORAL
Qty: 60 TABLET | Refills: 0 | Status: SHIPPED | OUTPATIENT
Start: 2019-10-29 | End: 2019-12-09 | Stop reason: SDUPTHER

## 2019-11-21 DIAGNOSIS — F90.2 ADHD (ATTENTION DEFICIT HYPERACTIVITY DISORDER), COMBINED TYPE: ICD-10-CM

## 2019-11-21 DIAGNOSIS — G40.909 SEIZURE DISORDER (HCC): ICD-10-CM

## 2019-11-21 RX ORDER — METHYLPHENIDATE HYDROCHLORIDE 36 MG/1
36 TABLET ORAL DAILY
Qty: 30 TABLET | Refills: 0 | Status: SHIPPED | OUTPATIENT
Start: 2019-11-21 | End: 2019-12-23 | Stop reason: SDUPTHER

## 2019-12-09 DIAGNOSIS — F43.10 POST TRAUMATIC STRESS DISORDER (PTSD): ICD-10-CM

## 2019-12-09 DIAGNOSIS — F43.0 PANIC ATTACK AS REACTION TO STRESS: ICD-10-CM

## 2019-12-09 DIAGNOSIS — F41.0 PANIC ATTACK AS REACTION TO STRESS: ICD-10-CM

## 2019-12-09 RX ORDER — HYDROXYZINE HYDROCHLORIDE 10 MG/1
TABLET, FILM COATED ORAL
Qty: 60 TABLET | Refills: 0 | Status: SHIPPED | OUTPATIENT
Start: 2019-12-09 | End: 2020-02-10 | Stop reason: SDUPTHER

## 2019-12-23 DIAGNOSIS — G40.909 SEIZURE DISORDER (HCC): ICD-10-CM

## 2019-12-23 DIAGNOSIS — F90.2 ADHD (ATTENTION DEFICIT HYPERACTIVITY DISORDER), COMBINED TYPE: ICD-10-CM

## 2019-12-24 RX ORDER — METHYLPHENIDATE HYDROCHLORIDE 36 MG/1
36 TABLET ORAL DAILY
Qty: 30 TABLET | Refills: 0 | Status: SHIPPED | OUTPATIENT
Start: 2019-12-24 | End: 2020-01-29 | Stop reason: SDUPTHER

## 2020-01-14 DIAGNOSIS — F43.0 PANIC ATTACK AS REACTION TO STRESS: ICD-10-CM

## 2020-01-14 DIAGNOSIS — G40.909 SEIZURE DISORDER (HCC): ICD-10-CM

## 2020-01-14 DIAGNOSIS — F41.0 PANIC ATTACK AS REACTION TO STRESS: ICD-10-CM

## 2020-01-14 DIAGNOSIS — F43.10 POST TRAUMATIC STRESS DISORDER (PTSD): ICD-10-CM

## 2020-01-14 DIAGNOSIS — F90.2 ADHD (ATTENTION DEFICIT HYPERACTIVITY DISORDER), COMBINED TYPE: ICD-10-CM

## 2020-01-14 RX ORDER — GUANFACINE 1 MG/1
0.5 TABLET ORAL NIGHTLY
Qty: 45 TABLET | Refills: 2 | Status: SHIPPED | OUTPATIENT
Start: 2020-01-14 | End: 2020-05-21 | Stop reason: SDUPTHER

## 2020-01-14 RX ORDER — LAMOTRIGINE 150 MG/1
150 TABLET ORAL 2 TIMES DAILY
Qty: 60 TABLET | Refills: 2 | Status: SHIPPED | OUTPATIENT
Start: 2020-01-14 | End: 2020-05-21 | Stop reason: SDUPTHER

## 2020-01-29 DIAGNOSIS — G40.909 SEIZURE DISORDER (HCC): ICD-10-CM

## 2020-01-29 DIAGNOSIS — F90.2 ADHD (ATTENTION DEFICIT HYPERACTIVITY DISORDER), COMBINED TYPE: ICD-10-CM

## 2020-01-29 RX ORDER — METHYLPHENIDATE HYDROCHLORIDE 36 MG/1
36 TABLET ORAL DAILY
Qty: 30 TABLET | Refills: 0 | Status: SHIPPED | OUTPATIENT
Start: 2020-01-29 | End: 2020-02-10 | Stop reason: SDUPTHER

## 2020-02-10 ENCOUNTER — OFFICE VISIT (OUTPATIENT)
Dept: PSYCHIATRY | Facility: CLINIC | Age: 30
End: 2020-02-10

## 2020-02-10 VITALS
WEIGHT: 137 LBS | DIASTOLIC BLOOD PRESSURE: 79 MMHG | HEIGHT: 62 IN | SYSTOLIC BLOOD PRESSURE: 122 MMHG | HEART RATE: 92 BPM | BODY MASS INDEX: 25.21 KG/M2

## 2020-02-10 DIAGNOSIS — F90.2 ADHD (ATTENTION DEFICIT HYPERACTIVITY DISORDER), COMBINED TYPE: Primary | ICD-10-CM

## 2020-02-10 DIAGNOSIS — F43.10 POST TRAUMATIC STRESS DISORDER (PTSD): ICD-10-CM

## 2020-02-10 DIAGNOSIS — F41.0 PANIC ATTACK AS REACTION TO STRESS: ICD-10-CM

## 2020-02-10 DIAGNOSIS — Z79.899 MEDICATION MANAGEMENT: ICD-10-CM

## 2020-02-10 DIAGNOSIS — F43.0 PANIC ATTACK AS REACTION TO STRESS: ICD-10-CM

## 2020-02-10 PROCEDURE — 99214 OFFICE O/P EST MOD 30 MIN: CPT | Performed by: NURSE PRACTITIONER

## 2020-02-10 RX ORDER — HYDROXYZINE HYDROCHLORIDE 10 MG/1
10 TABLET, FILM COATED ORAL 2 TIMES DAILY PRN
Qty: 60 TABLET | Refills: 2 | Status: SHIPPED | OUTPATIENT
Start: 2020-02-10 | End: 2020-05-21 | Stop reason: SDUPTHER

## 2020-02-10 RX ORDER — PROPRANOLOL HYDROCHLORIDE 10 MG/1
10 TABLET ORAL 2 TIMES DAILY PRN
Qty: 30 TABLET | Refills: 0 | Status: SHIPPED | OUTPATIENT
Start: 2020-02-10 | End: 2020-09-17

## 2020-02-10 RX ORDER — METHYLPHENIDATE HYDROCHLORIDE 36 MG/1
36 TABLET ORAL DAILY
Qty: 30 TABLET | Refills: 0 | Status: SHIPPED | OUTPATIENT
Start: 2020-02-10 | End: 2020-03-31 | Stop reason: SDUPTHER
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
## 2020-03-31 DIAGNOSIS — F90.2 ADHD (ATTENTION DEFICIT HYPERACTIVITY DISORDER), COMBINED TYPE: ICD-10-CM

## 2020-03-31 RX ORDER — METHYLPHENIDATE HYDROCHLORIDE 36 MG/1
36 TABLET ORAL DAILY
Qty: 30 TABLET | Refills: 0 | Status: SHIPPED | OUTPATIENT
Start: 2020-03-31 | End: 2020-05-04 | Stop reason: SDUPTHER

## 2020-05-04 DIAGNOSIS — F90.2 ADHD (ATTENTION DEFICIT HYPERACTIVITY DISORDER), COMBINED TYPE: ICD-10-CM

## 2020-05-04 RX ORDER — METHYLPHENIDATE HYDROCHLORIDE 36 MG/1
36 TABLET ORAL DAILY
Qty: 30 TABLET | Refills: 0 | Status: SHIPPED | OUTPATIENT
Start: 2020-05-04 | End: 2020-05-21 | Stop reason: SDUPTHER

## 2020-05-04 NOTE — TELEPHONE ENCOUNTER
Patient is requesting refill for concerta.  Med is due for refill and her appt with you is 06-29-20

## 2020-05-21 ENCOUNTER — OFFICE VISIT (OUTPATIENT)
Dept: PSYCHIATRY | Facility: CLINIC | Age: 30
End: 2020-05-21

## 2020-05-21 VITALS
WEIGHT: 141 LBS | BODY MASS INDEX: 25.95 KG/M2 | HEART RATE: 89 BPM | SYSTOLIC BLOOD PRESSURE: 117 MMHG | HEIGHT: 62 IN | DIASTOLIC BLOOD PRESSURE: 78 MMHG

## 2020-05-21 DIAGNOSIS — Z79.899 MEDICATION MANAGEMENT: Primary | ICD-10-CM

## 2020-05-21 DIAGNOSIS — F43.10 POST TRAUMATIC STRESS DISORDER (PTSD): ICD-10-CM

## 2020-05-21 DIAGNOSIS — F90.2 ADHD (ATTENTION DEFICIT HYPERACTIVITY DISORDER), COMBINED TYPE: ICD-10-CM

## 2020-05-21 DIAGNOSIS — G40.909 SEIZURE DISORDER (HCC): ICD-10-CM

## 2020-05-21 DIAGNOSIS — F41.0 PANIC ATTACK AS REACTION TO STRESS: ICD-10-CM

## 2020-05-21 DIAGNOSIS — F43.0 PANIC ATTACK AS REACTION TO STRESS: ICD-10-CM

## 2020-05-21 PROCEDURE — 99214 OFFICE O/P EST MOD 30 MIN: CPT | Performed by: PSYCHIATRY & NEUROLOGY

## 2020-05-21 RX ORDER — LAMOTRIGINE 150 MG/1
150 TABLET ORAL 2 TIMES DAILY
Qty: 60 TABLET | Refills: 2 | Status: SHIPPED | OUTPATIENT
Start: 2020-05-21 | End: 2020-10-16 | Stop reason: SDUPTHER

## 2020-05-21 RX ORDER — GUANFACINE 1 MG/1
0.5 TABLET ORAL NIGHTLY
Qty: 45 TABLET | Refills: 2 | Status: SHIPPED | OUTPATIENT
Start: 2020-05-21 | End: 2020-09-17

## 2020-05-21 RX ORDER — METHYLPHENIDATE HYDROCHLORIDE 36 MG/1
36 TABLET ORAL DAILY
Qty: 30 TABLET | Refills: 0 | Status: SHIPPED | OUTPATIENT
Start: 2020-05-21 | End: 2020-06-16 | Stop reason: SDUPTHER

## 2020-05-21 RX ORDER — HYDROXYZINE HYDROCHLORIDE 10 MG/1
10 TABLET, FILM COATED ORAL 2 TIMES DAILY PRN
Qty: 60 TABLET | Refills: 2 | Status: SHIPPED | OUTPATIENT
Start: 2020-05-21 | End: 2020-10-16 | Stop reason: SDUPTHER

## 2020-05-22 NOTE — PROGRESS NOTES
Subjective   Basia Schulte is a 30 y.o. female who presents today for follow up    Chief Complaint:  ADHD, PTSD, Panic Attacks    History of Present Illness: Patient is a 30-year-old  female who presents today for her initial evaluation by this provider though she was previously seeing another provider in the clinic.  Patient has a history of ADHD, anxiety with panic, and PTSD for which she has been receiving treatment.  Patient is currently managed on Concerta 36 mg p.o. daily, hydroxyzine 10 mg twice a day as needed for anxiety, and propranolol 10 mg as needed for anxiety or panic.  Patient reports that she is doing relatively well on this medication regimen.  She denies any major medication side effects.  She was having increased and difficult to control heart rate for which she had an evaluation by cardiology and wore a Holter monitor.  She even tried stopping Concerta to see if this made any difference and it did not improve.  She does not have higher heart rate all the time but sometimes she does have palpitations and SVT.  She continues to be followed by cardiology for evaluation of this problem.  She currently reports that she has no significant symptom burden.  She continues to have intermittent anxiety episodes and propranolol is helpful during that time.  She states that she is taking propranolol approximately 6 times.  She denies any medication side effects.  She denies issues with sleep or appetite.  She denies SI/HI/AVH.    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  She has had some increased stress as she was exposed to coronavirus and had to leave home for 2 weeks for self quarantine.  She has 3 kids who just finished online schooling which she is grateful for as that has been difficult with the school shutdown.    The following portions of the patient's history were reviewed and updated as appropriate: allergies, current medications, past family history, past medical history,  past social history, past surgical history and problem list.      Past Medical History:  Past Medical History:   Diagnosis Date   • ADD (attention deficit disorder) 2016    Orignally Dx as anxiety as kid   • History of kidney stones    • PID (pelvic inflammatory disease) 2016    Tx after seen in ER - STD w/u was (-)   • Seizure disorder (CMS/HCC)    • Vitamin D deficiency        Social History:  Social History     Socioeconomic History   • Marital status:      Spouse name: Not on file   • Number of children: Not on file   • Years of education: Not on file   • Highest education level: Not on file   Tobacco Use   • Smoking status: Former Smoker     Packs/day: 0.50     Years: 10.00     Pack years: 5.00     Types: Cigarettes     Start date:      Last attempt to quit: 2018     Years since quittin.3   • Smokeless tobacco: Never Used   Substance and Sexual Activity   • Alcohol use: No   • Drug use: No   • Sexual activity: Yes     Partners: Male     Birth control/protection: None   Social History Narrative    Caffeine:1-4  servings per day       Family History:  Family History   Problem Relation Age of Onset   • Breast cancer Paternal Grandmother    • Diabetes Paternal Grandmother    • Ovarian cancer Maternal Aunt    • Anxiety disorder Mother    • Depression Mother    • Anxiety disorder Father    • Depression Father    • Obesity Sister    • No Known Problems Brother    • Diabetes Maternal Grandmother    • Heart disease Maternal Grandmother    • Heart attack Maternal Grandmother    • Emphysema Maternal Grandfather    • No Known Problems Paternal Grandfather        Past Surgical History:  Past Surgical History:   Procedure Laterality Date   • CERVICAL CERCLAGE     • CERVICAL CERCLAGE  2014   • D&C WITH SUCTION     • D&C WITH SUCTION  2011   • EXTRACORPOREAL SHOCK WAVE LITHOTRIPSY (ESWL)     • PERINEAL LESION/CYST EXCISION N/A 2018    Surgeon: Jonh Davis MD;   [facility];  "path = benign       Problem List:  Patient Active Problem List   Diagnosis   • Annual GYN exam w/o problems   • ADD (attention deficit disorder)   • Seizure disorder (CMS/HCC)       Allergy:   Allergies   Allergen Reactions   • Latex Rash        Current Medications:   Current Outpatient Medications   Medication Sig Dispense Refill   • folic acid (FOLVITE) 1 MG tablet Take 1 mg by mouth Daily.  10   • guanFACINE (TENEX) 1 MG tablet Take 0.5 tablets by mouth Every Night. 45 tablet 2   • hydrOXYzine (ATARAX) 10 MG tablet Take 1 tablet by mouth 2 (Two) Times a Day As Needed for Anxiety. 60 tablet 2   • lamoTRIgine (LaMICtal) 150 MG tablet Take 1 tablet by mouth 2 (Two) Times a Day. 60 tablet 2   • methylphenidate (CONCERTA) 36 MG CR tablet Take 1 tablet by mouth Daily 30 tablet 0   • propranolol (INDERAL) 10 MG tablet Take 1 tablet by mouth 2 (Two) Times a Day As Needed (panic). 30 tablet 0     No current facility-administered medications for this visit.        Review of Symptoms:    Review of Systems   Constitutional: Negative.    HENT: Negative.    Eyes: Negative.    Respiratory: Negative.    Cardiovascular: Positive for palpitations.   Gastrointestinal: Negative.    Endocrine: Negative.    Genitourinary: Negative.    Musculoskeletal: Negative.    Skin: Negative.    Allergic/Immunologic: Negative.    Neurological: Negative.    Hematological: Negative.    Psychiatric/Behavioral: Positive for stress.         Physical Exam:   Blood pressure 117/78, pulse 89, height 157.5 cm (62.01\"), weight 64 kg (141 lb), not currently breastfeeding.    Appearance:  female of stated age in no acute distress  Gait, Station, Strength: Within normal limits    Mental Status Exam:   Hygiene:   good  Cooperation:  Cooperative  Eye Contact:  Good  Psychomotor Behavior:  Appropriate  Affect:  Full range  Mood: normal  Hopelessness: Denies  Speech:  Normal  Thought Process:  Goal directed and Linear  Thought Content:  Normal and Mood " congruent  Suicidal:  None  Homicidal:  None  Hallucinations:  None  Delusion:  None  Memory:  Intact  Orientation:  Person, Place, Time and Situation  Reliability:  good  Insight:  Good  Judgement:  Good  Impulse Control:  Good  Physical/Medical Issues:  Yes intermittent SVT and palpitations       Lab Results:   Office Visit on 05/21/2020   Component Date Value Ref Range Status   • External Amphetamine Screen Urine 05/21/2020 Negative   Final   • Amphetamine Cut-Off 05/21/2020 1000NG/ML   Final   • External Benzodiazepine Screen Uri* 05/21/2020 Negative   Final   • Benzodiazipine Cut-Off 05/21/2020 300NG/ML   Final   • External Cocaine Screen Urine 05/21/2020 Negative   Final   • Cocaine Cut-Off 05/21/2020 300NG/ML   Final   • External THC Screen Urine 05/21/2020 Negative   Final   • THC Cut-Off 05/21/2020 50NG/ML   Final   • External Methadone Screen Urine 05/21/2020 Negative   Final   • Methadone Cut-Off 05/21/2020 300NG/ML   Final   • External Methamphetamine Screen Ur* 05/21/2020 Negative   Final   • Methamphetamine Cut-Off 05/21/2020 1000NG/ML   Final   • External Oxycodone Screen Urine 05/21/2020 Negative   Final   • Oxycodone Cut-Off 05/21/2020 100NG/ML   Final   • External Buprenorphine Screen Urine 05/21/2020 Negative   Final   • Buprenorphine Cut-Off 05/21/2020 10NG/ML   Final   • External MDMA 05/21/2020 Negative   Final   • MDMA Cut-Off 05/21/2020 500NG/ML   Final   • External Opiates Screen Urine 05/21/2020 Negative   Final   • Opiates Cut-Off 05/21/2020 300NG/ML   Final       Assessment/Plan   Diagnoses and all orders for this visit:    Medication management  -     [facility] Drug Screen    ADHD (attention deficit hyperactivity disorder), combined type  -     methylphenidate (CONCERTA) 36 MG CR tablet; Take 1 tablet by mouth Daily  -     guanFACINE (TENEX) 1 MG tablet; Take 0.5 tablets by mouth Every Night.    Seizure disorder (CMS/HCC)  -     lamoTRIgine (LaMICtal) 150 MG tablet; Take 1 tablet by mouth  2 (Two) Times a Day.    Panic attack as reaction to stress  -     guanFACINE (TENEX) 1 MG tablet; Take 0.5 tablets by mouth Every Night.  -     hydrOXYzine (ATARAX) 10 MG tablet; Take 1 tablet by mouth 2 (Two) Times a Day As Needed for Anxiety.    Post traumatic stress disorder (PTSD)  -     hydrOXYzine (ATARAX) 10 MG tablet; Take 1 tablet by mouth 2 (Two) Times a Day As Needed for Anxiety.    -This is my initial encounter with the patient  -Patient is a 30-year-old  female who presents today for follow-up for ADHD, anxiety, and PTSD.  She is doing relatively well on her current medication regimen and does not have a significant symptom burden.  She continues to have intermittent panic spells with SVT and tachycardia.  This is been evaluated by cardiology and she has tried stopping her stimulant medication to see if it improves but had continued episodes.  -Reviewed previous available documentation  -Reviewed most recent available labs  -DELBERT reviewed and appropriate. Patient counseled on use of controlled substances.   -Continue Concerta 36 mg p.o. daily for ADHD  -Continue Tenex 0.5 mg p.o. as needed for anxiety or ADHD symptoms  -Continue propranolol 10 mg up to twice daily as needed for anxiety or panic  -Continue hydroxyzine 10 mg 2 times daily as needed for anxiety  -Continue Lamictal 150 mg twice daily for history of seizure disorder and mood stabilization.  Patient has not had a seizure since she was a teenager      Visit Diagnoses:    ICD-10-CM ICD-9-CM   1. Medication management Z79.899 V58.69   2. ADHD (attention deficit hyperactivity disorder), combined type F90.2 314.01   3. Seizure disorder (CMS/[facility]) G40.909 345.90   4. Panic attack as reaction to stress F41.0 308.0    F43.0    5. Post traumatic stress disorder (PTSD) F43.10 309.81       TREATMENT PLAN/GOALS: Continue supportive psychotherapy efforts and medications as indicated. Treatment and medication options discussed during today's  visit. Patient acknowledged and verbally consented to continue with current treatment plan and was educated on the importance of compliance with treatment and follow-up appointments.    MEDICATION ISSUES:    Discussed medication options and treatment plan of prescribed medication as well as the risks, benefits, and side effects including potential falls, possible impaired driving and metabolic adversities among others. Patient is agreeable to call the office with any worsening of symptoms or onset of side effects. Patient is agreeable to call 911 or go to the nearest ER should he/she begin having SI/HI.     MEDS ORDERED DURING VISIT:  New Medications Ordered This Visit   Medications   • methylphenidate (CONCERTA) 36 MG CR tablet     Sig: Take 1 tablet by mouth Daily     Dispense:  30 tablet     Refill:  0     Fill when due   • guanFACINE (TENEX) 1 MG tablet     Sig: Take 0.5 tablets by mouth Every Night.     Dispense:  45 tablet     Refill:  2   • hydrOXYzine (ATARAX) 10 MG tablet     Sig: Take 1 tablet by mouth 2 (Two) Times a Day As Needed for Anxiety.     Dispense:  60 tablet     Refill:  2   • lamoTRIgine (LaMICtal) 150 MG tablet     Sig: Take 1 tablet by mouth 2 (Two) Times a Day.     Dispense:  60 tablet     Refill:  2       Return in about 3 months (around 8/21/2020).             This document has been electronically signed by Elkin Pisano MD  May 22, 2020 10:23

## 2020-06-16 DIAGNOSIS — F90.2 ADHD (ATTENTION DEFICIT HYPERACTIVITY DISORDER), COMBINED TYPE: ICD-10-CM

## 2020-06-16 RX ORDER — METHYLPHENIDATE HYDROCHLORIDE 36 MG/1
36 TABLET ORAL DAILY
Qty: 30 TABLET | Refills: 0 | Status: SHIPPED | OUTPATIENT
Start: 2020-06-16 | End: 2020-07-28 | Stop reason: SDUPTHER

## 2020-07-22 PROCEDURE — U0003 INFECTIOUS AGENT DETECTION BY NUCLEIC ACID (DNA OR RNA); SEVERE ACUTE RESPIRATORY SYNDROME CORONAVIRUS 2 (SARS-COV-2) (CORONAVIRUS DISEASE [COVID-19]), AMPLIFIED PROBE TECHNIQUE, MAKING USE OF HIGH THROUGHPUT TECHNOLOGIES AS DESCRIBED BY CMS-2020-01-R: HCPCS | Performed by: FAMILY MEDICINE

## 2020-07-24 ENCOUNTER — TELEPHONE (OUTPATIENT)
Dept: URGENT CARE | Facility: CLINIC | Age: 30
End: 2020-07-24

## 2020-07-24 NOTE — TELEPHONE ENCOUNTER
Gave pt neg cv results - she did not have any questions or concerns             ----- Message from Nicholas Benavides MD sent at 7/24/2020  4:03 PM EDT -----  COVID is not detected.  Please notify the patient.-Nicholas Benavides M.D.

## 2020-07-28 DIAGNOSIS — F90.2 ADHD (ATTENTION DEFICIT HYPERACTIVITY DISORDER), COMBINED TYPE: ICD-10-CM

## 2020-07-28 RX ORDER — METHYLPHENIDATE HYDROCHLORIDE 36 MG/1
36 TABLET ORAL DAILY
Qty: 30 TABLET | Refills: 0 | Status: SHIPPED | OUTPATIENT
Start: 2020-07-28 | End: 2020-09-22 | Stop reason: SDUPTHER

## 2020-09-17 ENCOUNTER — LAB (OUTPATIENT)
Dept: LAB | Facility: HOSPITAL | Age: 30
End: 2020-09-17

## 2020-09-17 ENCOUNTER — OFFICE VISIT (OUTPATIENT)
Dept: OBSTETRICS AND GYNECOLOGY | Facility: CLINIC | Age: 30
End: 2020-09-17

## 2020-09-17 VITALS
BODY MASS INDEX: 26.39 KG/M2 | WEIGHT: 149 LBS | SYSTOLIC BLOOD PRESSURE: 110 MMHG | RESPIRATION RATE: 14 BRPM | DIASTOLIC BLOOD PRESSURE: 72 MMHG

## 2020-09-17 DIAGNOSIS — R10.32 LLQ ABDOMINAL PAIN: Primary | ICD-10-CM

## 2020-09-17 DIAGNOSIS — N91.2 AMENORRHEA: ICD-10-CM

## 2020-09-17 DIAGNOSIS — N94.10 DYSPAREUNIA IN FEMALE: ICD-10-CM

## 2020-09-17 LAB
ERYTHROCYTE [SEDIMENTATION RATE] IN BLOOD: 5 MM/HR (ref 0–20)
HCG INTACT+B SERPL-ACNC: <0.5 MIU/ML

## 2020-09-17 PROCEDURE — 36415 COLL VENOUS BLD VENIPUNCTURE: CPT | Performed by: OBSTETRICS & GYNECOLOGY

## 2020-09-17 PROCEDURE — 84702 CHORIONIC GONADOTROPIN TEST: CPT | Performed by: OBSTETRICS & GYNECOLOGY

## 2020-09-17 PROCEDURE — 99214 OFFICE O/P EST MOD 30 MIN: CPT | Performed by: OBSTETRICS & GYNECOLOGY

## 2020-09-17 PROCEDURE — 85652 RBC SED RATE AUTOMATED: CPT | Performed by: OBSTETRICS & GYNECOLOGY

## 2020-09-17 NOTE — PROGRESS NOTES
Subjective   Chief Complaint   Patient presents with   • Dyspareunia     Basia Schulte is a 30 y.o. year old .  Patient's last menstrual period was 2020 (approximate).  She presents to be seen because of new onset pelvic pain in the left lower quadrant with associated painful intercourse.  The pain began as dyspareunia 3 weeks ago.  The constant left lower quadrant pain then followed about 2 weeks after that.  She is also late for her cycle.  Pregnancy test was negative.  The left lower quadrant pain does not radiate.  It is constantly present but she really only notices it when she is less active.  Thus far does not interfere with day-to-day function.  She is mutually monogamous.  She is been  for 8 years.  Her relationship is in good straits.  [place] tends to hurt more with deep penetration or thrusting.  There is no anal intercourse.  There is no pain with urination.  She has been a bit more constipated more than normal.    OTHER THINGS SHE WANTS TO DISCUSS TODAY:  Nothing else    The following portions of the patient's history were reviewed and updated as appropriate:current medications and allergies    Social History    Tobacco Use      Smoking status: Former Smoker        Packs/day: 0.50        Years: 10.00        Pack years: 5        Types: Cigarettes        Start date:         Quit date: 2018        Years since quittin.6      Smokeless tobacco: Never Used    Review of Systems  Constitutional POS: nothing reported    NEG: anorexia or night sweats   Genitourinary POS: see HPI    NEG: dysuria or hematuria   Gastointestinal POS: see HPI    NEG: bloating, change in bowel habits, melena or reflux symptoms   Integument POS: nothing reported    NEG: moles that are changing in size, shape, color or rashes   Breast POS: nothing reported    NEG: persistent breast lump, skin dimpling or nipple discharge         Objective   /72   Resp 14   Wt 67.6 kg (149 lb)   LMP 2020  (Approximate)   Breastfeeding No   BMI 26.39 kg/m²     General:  well developed; well nourished  no acute distress   Pelvis: Clinical staff was present for exam  External genitalia:  normal appearance of the external genitalia including Bartholin's and Marks's glands.  :  urethral meatus normal;  Vaginal:  normal pink mucosa without prolapse or lesions.  Cervix:  normal appearance. cervical motion tenderness is absent;  Uterus:  normal size, shape and consistency. anteverted; tenderness to palpation is absent;  Adnexa:  normal bimanual exam of the adnexa.  Rectal:  digital rectal exam not performed; anus visually normal appearing.  Pelvic floor pain: her symptoms ARE reproduced with palpation over the rectum;      Lab Review   No data reviewed    Imaging   No data reviewed        Assessment   1. Left lower quadrant pain with dyspareunia.  This is a new problem that does require additional work-up  2. Constipation  3. Delayed menses     Plan   1. The following tests were ordered today: ESR, HCG and STD swabs for GC and chlamydia.  It was explained to Basia that all lab test should be back within the one week after they are performed. She will be notified about the results, regardless of the findings. If she has not been contacted by the office within 2 weeks after the test has been performed, it is her responsibility to contact us to learn about her results.  2. Ultrasound needs to be done after her next menses.   3. The importance of keeping all planned follow-up and taking all medications as prescribed was emphasized.  4. Follow up after ultrasound          This note was electronically signed.    Wilver Rivera M.D.  September 17, 2020    Note: Speech recognition transcription software may have been used to create portions of this document.  An attempt at proofreading has been made but errors in transcription could still be present.

## 2020-09-18 ENCOUNTER — TELEPHONE (OUTPATIENT)
Dept: OBSTETRICS AND GYNECOLOGY | Facility: CLINIC | Age: 30
End: 2020-09-18

## 2020-09-18 NOTE — TELEPHONE ENCOUNTER
----- Message from Wilver Rivera MD sent at 9/18/2020  6:42 AM EDT -----  Please call Basia and let her know her blood test results were normal. She is not pregnant and her sed rate is normal.  This argues against this being PID or any other systemic inflammatory/infectious process.

## 2020-09-21 ENCOUNTER — TELEPHONE (OUTPATIENT)
Dept: OBSTETRICS AND GYNECOLOGY | Facility: CLINIC | Age: 30
End: 2020-09-21

## 2020-09-21 NOTE — TELEPHONE ENCOUNTER
ALLEN      PT HAD   (+) PREGNANCY TEST THIS MORNING. WAS TO HAVE U/S TOMORROW. NOT SURE WHAT TO DO.

## 2020-09-22 DIAGNOSIS — F90.2 ADHD (ATTENTION DEFICIT HYPERACTIVITY DISORDER), COMBINED TYPE: ICD-10-CM

## 2020-09-23 RX ORDER — METHYLPHENIDATE HYDROCHLORIDE 36 MG/1
36 TABLET ORAL DAILY
Qty: 30 TABLET | Refills: 0 | Status: SHIPPED | OUTPATIENT
Start: 2020-09-23 | End: 2020-12-02 | Stop reason: SDUPTHER

## 2020-09-24 ENCOUNTER — RESULTS ENCOUNTER (OUTPATIENT)
Dept: OBSTETRICS AND GYNECOLOGY | Facility: CLINIC | Age: 30
End: 2020-09-24

## 2020-09-24 DIAGNOSIS — R10.32 LLQ ABDOMINAL PAIN: ICD-10-CM

## 2020-09-24 DIAGNOSIS — N94.10 DYSPAREUNIA IN FEMALE: ICD-10-CM

## 2020-10-09 PROBLEM — O99.350 SEIZURE DISORDER DURING PREGNANCY (HCC): Status: ACTIVE | Noted: 2020-10-09

## 2020-10-09 PROBLEM — O09.90 HIGH-RISK PREGNANCY: Status: ACTIVE | Noted: 2020-10-09

## 2020-10-09 PROBLEM — G40.909 SEIZURE DISORDER DURING PREGNANCY (HCC): Status: ACTIVE | Noted: 2020-10-09

## 2020-10-14 ENCOUNTER — HOSPITAL ENCOUNTER (EMERGENCY)
Facility: HOSPITAL | Age: 30
Discharge: HOME | End: 2020-10-14
Attending: FAMILY MEDICINE | Admitting: FAMILY MEDICINE

## 2020-10-14 ENCOUNTER — APPOINTMENT (OUTPATIENT)
Dept: ULTRASOUND IMAGING | Facility: HOSPITAL | Age: 30
End: 2020-10-14

## 2020-10-14 VITALS
RESPIRATION RATE: 18 BRPM | OXYGEN SATURATION: 99 % | DIASTOLIC BLOOD PRESSURE: 72 MMHG | TEMPERATURE: 98.6 F | BODY MASS INDEX: 26.58 KG/M2 | SYSTOLIC BLOOD PRESSURE: 118 MMHG | HEART RATE: 80 BPM | HEIGHT: 63 IN | WEIGHT: 150 LBS

## 2020-10-14 DIAGNOSIS — O20.0 THREATENED MISCARRIAGE: Primary | ICD-10-CM

## 2020-10-14 LAB
ABO GROUP BLD: NORMAL
ALBUMIN SERPL-MCNC: 4.54 G/DL (ref 3.5–5.2)
ALBUMIN/GLOB SERPL: 1.8 G/DL
ALP SERPL-CCNC: 84 U/L (ref 39–117)
ALT SERPL W P-5'-P-CCNC: 33 U/L (ref 1–33)
ANION GAP SERPL CALCULATED.3IONS-SCNC: 10.8 MMOL/L (ref 5–15)
AST SERPL-CCNC: 32 U/L (ref 1–32)
BACTERIA UR QL AUTO: ABNORMAL /HPF
BASOPHILS # BLD AUTO: 0.05 10*3/MM3 (ref 0–0.2)
BASOPHILS NFR BLD AUTO: 0.5 % (ref 0–1.5)
BILIRUB SERPL-MCNC: 0.2 MG/DL (ref 0–1.2)
BILIRUB UR QL STRIP: NEGATIVE
BLD GP AB SCN SERPL QL: NEGATIVE
BUN SERPL-MCNC: 15 MG/DL (ref 6–20)
BUN/CREAT SERPL: 21.7 (ref 7–25)
CALCIUM SPEC-SCNC: 9.6 MG/DL (ref 8.6–10.5)
CHLORIDE SERPL-SCNC: 104 MMOL/L (ref 98–107)
CLARITY UR: ABNORMAL
CO2 SERPL-SCNC: 23.2 MMOL/L (ref 22–29)
COLOR UR: YELLOW
CREAT SERPL-MCNC: 0.69 MG/DL (ref 0.57–1)
DEPRECATED RDW RBC AUTO: 42.1 FL (ref 37–54)
EOSINOPHIL # BLD AUTO: 0.05 10*3/MM3 (ref 0–0.4)
EOSINOPHIL NFR BLD AUTO: 0.5 % (ref 0.3–6.2)
ERYTHROCYTE [DISTWIDTH] IN BLOOD BY AUTOMATED COUNT: 12.1 % (ref 12.3–15.4)
GFR SERPL CREATININE-BSD FRML MDRD: 101 ML/MIN/1.73
GLOBULIN UR ELPH-MCNC: 2.5 GM/DL
GLUCOSE SERPL-MCNC: 92 MG/DL (ref 65–99)
GLUCOSE UR STRIP-MCNC: NEGATIVE MG/DL
HCG INTACT+B SERPL-ACNC: 1681 MIU/ML
HCG SERPL QL: POSITIVE
HCT VFR BLD AUTO: 39.5 % (ref 34–46.6)
HGB BLD-MCNC: 13 G/DL (ref 12–15.9)
HGB UR QL STRIP.AUTO: ABNORMAL
HYALINE CASTS UR QL AUTO: ABNORMAL /LPF
HYDATID CYST SPEC WET PREP: ABNORMAL
IMM GRANULOCYTES # BLD AUTO: 0.03 10*3/MM3 (ref 0–0.05)
IMM GRANULOCYTES NFR BLD AUTO: 0.3 % (ref 0–0.5)
KETONES UR QL STRIP: NEGATIVE
KOH PREP NAIL: NORMAL
LEUKOCYTE ESTERASE UR QL STRIP.AUTO: ABNORMAL
LIPASE SERPL-CCNC: 26 U/L (ref 13–60)
LYMPHOCYTES # BLD AUTO: 2.26 10*3/MM3 (ref 0.7–3.1)
LYMPHOCYTES NFR BLD AUTO: 22.7 % (ref 19.6–45.3)
MCH RBC QN AUTO: 31.2 PG (ref 26.6–33)
MCHC RBC AUTO-ENTMCNC: 32.9 G/DL (ref 31.5–35.7)
MCV RBC AUTO: 94.7 FL (ref 79–97)
MONOCYTES # BLD AUTO: 0.69 10*3/MM3 (ref 0.1–0.9)
MONOCYTES NFR BLD AUTO: 6.9 % (ref 5–12)
NEUTROPHILS NFR BLD AUTO: 6.87 10*3/MM3 (ref 1.7–7)
NEUTROPHILS NFR BLD AUTO: 69.1 % (ref 42.7–76)
NITRITE UR QL STRIP: NEGATIVE
NRBC BLD AUTO-RTO: 0 /100 WBC (ref 0–0.2)
NUMBER OF DOSES: NORMAL
PH UR STRIP.AUTO: 8 [PH] (ref 5–8)
PLATELET # BLD AUTO: 270 10*3/MM3 (ref 140–450)
PMV BLD AUTO: 10.8 FL (ref 6–12)
POTASSIUM SERPL-SCNC: 4.3 MMOL/L (ref 3.5–5.2)
PROT SERPL-MCNC: 7 G/DL (ref 6–8.5)
PROT UR QL STRIP: NEGATIVE
RBC # BLD AUTO: 4.17 10*6/MM3 (ref 3.77–5.28)
RBC # UR: ABNORMAL /HPF
REF LAB TEST METHOD: ABNORMAL
RH BLD: POSITIVE
SODIUM SERPL-SCNC: 138 MMOL/L (ref 136–145)
SP GR UR STRIP: 1.01 (ref 1–1.03)
SQUAMOUS #/AREA URNS HPF: ABNORMAL /HPF
T VAGINALIS SPEC QL WET PREP: ABNORMAL
UROBILINOGEN UR QL STRIP: ABNORMAL
WBC # BLD AUTO: 9.95 10*3/MM3 (ref 3.4–10.8)
WBC SPEC QL WET PREP: ABNORMAL
WBC UR QL AUTO: ABNORMAL /HPF
YEAST GENITAL QL WET PREP: ABNORMAL

## 2020-10-14 PROCEDURE — 86901 BLOOD TYPING SEROLOGIC RH(D): CPT | Performed by: FAMILY MEDICINE

## 2020-10-14 PROCEDURE — 83690 ASSAY OF LIPASE: CPT | Performed by: FAMILY MEDICINE

## 2020-10-14 PROCEDURE — 87086 URINE CULTURE/COLONY COUNT: CPT | Performed by: FAMILY MEDICINE

## 2020-10-14 PROCEDURE — 80053 COMPREHEN METABOLIC PANEL: CPT | Performed by: FAMILY MEDICINE

## 2020-10-14 PROCEDURE — 86850 RBC ANTIBODY SCREEN: CPT | Performed by: FAMILY MEDICINE

## 2020-10-14 PROCEDURE — 81001 URINALYSIS AUTO W/SCOPE: CPT | Performed by: FAMILY MEDICINE

## 2020-10-14 PROCEDURE — 99284 EMERGENCY DEPT VISIT MOD MDM: CPT

## 2020-10-14 PROCEDURE — 84703 CHORIONIC GONADOTROPIN ASSAY: CPT | Performed by: FAMILY MEDICINE

## 2020-10-14 PROCEDURE — 86901 BLOOD TYPING SEROLOGIC RH(D): CPT

## 2020-10-14 PROCEDURE — 87210 SMEAR WET MOUNT SALINE/INK: CPT | Performed by: FAMILY MEDICINE

## 2020-10-14 PROCEDURE — 86900 BLOOD TYPING SEROLOGIC ABO: CPT | Performed by: FAMILY MEDICINE

## 2020-10-14 PROCEDURE — 99283 EMERGENCY DEPT VISIT LOW MDM: CPT

## 2020-10-14 PROCEDURE — 76817 TRANSVAGINAL US OBSTETRIC: CPT

## 2020-10-14 PROCEDURE — 86900 BLOOD TYPING SEROLOGIC ABO: CPT

## 2020-10-14 PROCEDURE — 84702 CHORIONIC GONADOTROPIN TEST: CPT | Performed by: FAMILY MEDICINE

## 2020-10-14 PROCEDURE — 85025 COMPLETE CBC W/AUTO DIFF WBC: CPT | Performed by: FAMILY MEDICINE

## 2020-10-14 PROCEDURE — 87220 TISSUE EXAM FOR FUNGI: CPT | Performed by: FAMILY MEDICINE

## 2020-10-14 RX ORDER — SODIUM CHLORIDE 0.9 % (FLUSH) 0.9 %
10 SYRINGE (ML) INJECTION AS NEEDED
Status: DISCONTINUED | OUTPATIENT
Start: 2020-10-14 | End: 2020-10-15 | Stop reason: HOSPADM

## 2020-10-14 RX ORDER — PNV NO.95/FERROUS FUM/FOLIC AC 28MG-0.8MG
1 TABLET ORAL DAILY
Qty: 30 TABLET | Refills: 0 | Status: SHIPPED | OUTPATIENT
Start: 2020-10-14

## 2020-10-15 ENCOUNTER — OFFICE VISIT (OUTPATIENT)
Dept: OBSTETRICS AND GYNECOLOGY | Facility: CLINIC | Age: 30
End: 2020-10-15

## 2020-10-15 VITALS — RESPIRATION RATE: 14 BRPM | WEIGHT: 150 LBS | BODY MASS INDEX: 26.57 KG/M2

## 2020-10-15 DIAGNOSIS — O20.0 THREATENED MISCARRIAGE: Primary | ICD-10-CM

## 2020-10-15 PROCEDURE — 99213 OFFICE O/P EST LOW 20 MIN: CPT | Performed by: OBSTETRICS & GYNECOLOGY

## 2020-10-15 NOTE — PROGRESS NOTES
Subjective   Chief Complaint   Patient presents with   • Follow-up       Basia Schulte is a 30 y.o. year old .  Patient's last menstrual period was 2020 (approximate).  She presents because of spotting in early pregnancy.  Is never been heavy but it has been going on for several weeks now.    The following portions of the patient's history were reviewed and updated as appropriate:current medications and allergies    Social History    Tobacco Use      Smoking status: Former Smoker        Packs/day: 0.50        Years: 10.00        Pack years: 5        Types: Cigarettes        Start date:         Quit date: 2018        Years since quittin.7      Smokeless tobacco: Never Used         Objective   Resp 14   Wt 68 kg (150 lb)   LMP 2020 (Approximate)   Breastfeeding No   BMI 26.57 kg/m²     Lab Review   No data reviewed    Imaging   Pelvic ultrasound images independantly reviewed - Ultrasound shows an intrauterine fetus with heart rate of 60 bpm.  There is a 4-week discrepancy between menstrual dating and fetal size        Assessment   1. Threatened miscarriage with size date discrepancy and low fetal heart rate     Plan   1. Repeat ultrasound in 1 week  2. The importance of keeping all planned follow-up and taking all medications as prescribed was emphasized.           This note was electronically signed.    Wilver Rivera M.D.  October 15, 2020    Total time spent today with Basia  was 20 minutes (level 3).  Off this time, 90% was spent face-to-face time coordinating care, answering her questions and counseling regarding pathophysiology of her presenting problem along with plans for any diagnositc work-up and treatment.    Note: Speech recognition transcription software may have been used to create portions of this document.  An attempt at proofreading has been made but errors in transcription could still be present.

## 2020-10-15 NOTE — ED PROVIDER NOTES
Subjective   30-year-old female G7P 3 presents the emergency room with planes of vaginal bleeding.  Patient states that she had her last menstrual cycle and August 6.  She states that she started having some vaginal bleeding today.  She states that she has had lower abdominal cramping.  She states she had nausea but no vomiting.  She denies pain with urination.  She denies fever chills.  She states she has had miscarriages in the past she states she had total of 3 miscarriages.  States her last miscarriage was in 2013.  She states she does take Lamictal for seizures.  States she has not had a seizure in 8 years.  She states she was previously on Concerta but took herself off medication when she found out she was pregnant.      Vaginal Bleeding - Pregnant  Quality:  Bright red  Severity:  Moderate  Timing:  Constant  Progression:  Improving  Pregnancy confirmed by ultrasound: no    Prenatal care:  No prenatal care  Relieved by:  Nothing  Worsened by:  Nothing  Associated symptoms: nausea    Associated symptoms: no abdominal pain, no back pain, no dyspareunia, no dysuria, no fatigue, no fever and no vaginal discharge        Review of Systems   Constitutional: Negative for fatigue and fever.   Gastrointestinal: Positive for nausea. Negative for abdominal pain.   Genitourinary: Positive for vaginal bleeding. Negative for dyspareunia, dysuria and vaginal discharge.   Musculoskeletal: Negative for back pain.   All other systems reviewed and are negative.      Past Medical History:   Diagnosis Date   • ADD (attention deficit disorder) 2016    Orignally Dx as anxiety as kid   • COVID-19 08/2020   • History of kidney stones 2009   • PID (pelvic inflammatory disease) 2016    Tx after seen in ER - STD w/u was (-)   • Seizure disorder (CMS/[facility]) 2002   • Vitamin D deficiency        Allergies   Allergen Reactions   • Latex Rash       Past Surgical History:   Procedure Laterality Date   • CERVICAL CERCLAGE  2011   • CERVICAL  CERCLAGE  2014   • D&C WITH SUCTION     • D&C WITH SUCTION  2011   • EXTRACORPOREAL SHOCK WAVE LITHOTRIPSY (ESWL)     • PERINEAL LESION/CYST EXCISION N/A 2018    Surgeon: Jonh Davis MD;   [facility]; path = benign       Family History   Problem Relation Age of Onset   • Breast cancer Paternal Grandmother    • Diabetes Paternal Grandmother    • Ovarian cancer Maternal Aunt    • Anxiety disorder Mother    • Depression Mother    • Anxiety disorder Father    • Depression Father    • Obesity Sister    • No Known Problems Brother    • Diabetes Maternal Grandmother    • Heart disease Maternal Grandmother    • Heart attack Maternal Grandmother    • Emphysema Maternal Grandfather    • No Known Problems Paternal Grandfather        Social History     Socioeconomic History   • Marital status:      Spouse name: Not on file   • Number of children: Not on file   • Years of education: Not on file   • Highest education level: Not on file   Tobacco Use   • Smoking status: Former Smoker     Packs/day: 0.50     Years: 10.00     Pack years: 5.00     Types: Cigarettes     Start date:      Quit date: 2018     Years since quittin.7   • Smokeless tobacco: Never Used   Substance and Sexual Activity   • Alcohol use: No   • Drug use: No   • Sexual activity: Yes     Partners: Male     Birth control/protection: None   Social History Narrative    Caffeine:1-4  servings per day           Objective   Physical Exam  Vitals signs and nursing note reviewed.   Constitutional:       Appearance: She is not ill-appearing.      Comments: Nontoxic-appearing.   HENT:      Head: Normocephalic and atraumatic.      Nose: Nose normal.      Mouth/Throat:      Mouth: Mucous membranes are moist.      Pharynx: No posterior oropharyngeal erythema.   Eyes:      Extraocular Movements: Extraocular movements intact.      Conjunctiva/sclera: Conjunctivae normal.      Pupils: Pupils are equal, round, and reactive to light.   Neck:       Musculoskeletal: Neck supple. No muscular tenderness.   Cardiovascular:      Rate and Rhythm: Normal rate and regular rhythm.      Comments: 2+ pulses 2+ dorsalis pedis pulses no extrasystoles  Pulmonary:      Effort: Pulmonary effort is normal.      Breath sounds: Normal breath sounds. No wheezing, rhonchi or rales.      Comments: No accessory muscle use  Abdominal:      General: Bowel sounds are normal.      Palpations: Abdomen is soft.      Tenderness: There is no guarding or rebound.      Comments: Negative Rovsing negative Jacinto's   Musculoskeletal: Normal range of motion.      Right lower leg: No edema.      Left lower leg: No edema.   Skin:     General: Skin is warm and dry.      Coloration: Skin is not pale.      Findings: No erythema or rash.      Comments: No petechia   Neurological:      General: No focal deficit present.      Mental Status: She is alert and oriented to person, place, and time.      Sensory: No sensory deficit.   Psychiatric:         Mood and Affect: Mood normal.         Procedures           ED Course  ED Course as of Oct 14 2243   Wed Oct 14, 2020   2131 Have performed pelvic examination with RN chaperone.  Patient noted to have brown discharge old blood cervix is closed no cervicitis.  No other lesions.  No active bleeding.    [BB]   2146 Patient white blood cell count hemoglobin hematocrit unremarkable    [BB]   2147 Us Ob Transvaginal    Result Date: 10/14/2020  1. There is a gestational sac with yolk sac and fetal pole within the posterior uterine fundus. There is surrounding heterogeneous material which is most concerning for blood products and there is some fluid in the endocervical canal. Findings are most consistent with a threatened . Positive fetal heart motion could not be documented. This is a intrauterine gestation of undetermined viability. Follow-up recommended. 2. Left ovary not seen. Small amount of free fluid in the cul-de-sac. 3. Estimated gestational age  is 5 weeks 5 days Signer Name: Gabriella Leiva MD  Signed: 10/14/2020 9:38 PM  Workstation [facility]        [BB]   2147 Patient's ultrasound concerning for threatened miscarriage.    [BB]   2157 3+ white blood cells on wet prep KOH unremarkable    [BB]   2203 Patient HCG positive    [BB]   2212 CMP is unremarkable.  Patient urinalysis 1+ bacteria but is noted to be contaminated urine    [BB]   2235 Patient blood type a positive    [BB]   2241 Patient resting in room.  Have discussed patient will need repeat hCG quantitative in 48 to 72 hours.  Have discussed warning signs of when to emergency room patient was understandably have discussed no heavy lifting.  Discussed rest and drink plenty fluids    [BB]      ED Course User Index  [BB] Johnnie Hoover MD                                           [facility]    Final diagnoses:   Threatened miscarriage            Johnnie Hoover MD  10/14/20 2245

## 2020-10-16 ENCOUNTER — TELEPHONE (OUTPATIENT)
Dept: OBSTETRICS AND GYNECOLOGY | Facility: CLINIC | Age: 30
End: 2020-10-16

## 2020-10-16 DIAGNOSIS — G40.909 SEIZURE DISORDER (HCC): ICD-10-CM

## 2020-10-16 DIAGNOSIS — F43.0 PANIC ATTACK AS REACTION TO STRESS: ICD-10-CM

## 2020-10-16 DIAGNOSIS — F41.0 PANIC ATTACK AS REACTION TO STRESS: ICD-10-CM

## 2020-10-16 DIAGNOSIS — F43.10 POST TRAUMATIC STRESS DISORDER (PTSD): ICD-10-CM

## 2020-10-16 LAB — BACTERIA SPEC AEROBE CULT: NORMAL

## 2020-10-16 NOTE — TELEPHONE ENCOUNTER
Pt called office with c/o's increased vaginal bleeding today. Was seen yesterday in our office with likely early pregnancy loss.  U/S today demonstrates no fetal HR.  Pt advised to keelp her appt next week with Dr Rivera, report to ER with bleeding saturating a maxi-pad more then twice and hour or severe abd cramping. Voices understanding.

## 2020-10-19 RX ORDER — LAMOTRIGINE 150 MG/1
150 TABLET ORAL 2 TIMES DAILY
Qty: 60 TABLET | Refills: 2 | Status: SHIPPED | OUTPATIENT
Start: 2020-10-19

## 2020-10-19 RX ORDER — HYDROXYZINE HYDROCHLORIDE 10 MG/1
10 TABLET, FILM COATED ORAL 2 TIMES DAILY PRN
Qty: 60 TABLET | Refills: 2 | Status: SHIPPED | OUTPATIENT
Start: 2020-10-19

## 2020-10-21 ENCOUNTER — TELEMEDICINE (OUTPATIENT)
Dept: PSYCHIATRY | Facility: CLINIC | Age: 30
End: 2020-10-21

## 2020-10-21 ENCOUNTER — ROUTINE PRENATAL (OUTPATIENT)
Dept: OBSTETRICS AND GYNECOLOGY | Facility: CLINIC | Age: 30
End: 2020-10-21

## 2020-10-21 VITALS — WEIGHT: 154 LBS | DIASTOLIC BLOOD PRESSURE: 70 MMHG | BODY MASS INDEX: 27.28 KG/M2 | SYSTOLIC BLOOD PRESSURE: 118 MMHG

## 2020-10-21 DIAGNOSIS — O03.9 COMPLETE MISCARRIAGE: Primary | ICD-10-CM

## 2020-10-21 DIAGNOSIS — F90.2 ADHD (ATTENTION DEFICIT HYPERACTIVITY DISORDER), COMBINED TYPE: Primary | ICD-10-CM

## 2020-10-21 DIAGNOSIS — F43.10 POST TRAUMATIC STRESS DISORDER (PTSD): ICD-10-CM

## 2020-10-21 DIAGNOSIS — F43.0 PANIC ATTACK AS REACTION TO STRESS: ICD-10-CM

## 2020-10-21 DIAGNOSIS — F43.21 GRIEF ASSOCIATED WITH LOSS OF FETUS: ICD-10-CM

## 2020-10-21 DIAGNOSIS — G47.09 INITIAL INSOMNIA: ICD-10-CM

## 2020-10-21 DIAGNOSIS — F41.0 PANIC ATTACK AS REACTION TO STRESS: ICD-10-CM

## 2020-10-21 PROBLEM — G40.909 SEIZURE DISORDER DURING PREGNANCY (HCC): Status: RESOLVED | Noted: 2020-10-09 | Resolved: 2020-10-21

## 2020-10-21 PROBLEM — O99.350 SEIZURE DISORDER DURING PREGNANCY (HCC): Status: RESOLVED | Noted: 2020-10-09 | Resolved: 2020-10-21

## 2020-10-21 PROBLEM — O09.90 HIGH-RISK PREGNANCY: Status: RESOLVED | Noted: 2020-10-09 | Resolved: 2020-10-21

## 2020-10-21 PROCEDURE — 99214 OFFICE O/P EST MOD 30 MIN: CPT | Performed by: PSYCHIATRY & NEUROLOGY

## 2020-10-21 PROCEDURE — 99213 OFFICE O/P EST LOW 20 MIN: CPT | Performed by: OBSTETRICS & GYNECOLOGY

## 2020-10-21 NOTE — PROGRESS NOTES
"Chief Complaint   Patient presents with   • Routine Prenatal Visit       HPI: Basia is a  currently at Unknown who today reports the following:  Nausea - No; Vaginal bleeding -  {yes/NO:80565::\"No\"}; Heartburn - {yes/NO:99780::\"No\"}.    ROS:  GI: Constipation - {yes/NO:60962::\"No\"}; Diarrhea - {yes/NO:65211::\"No\"}    Neuro: Headache - {yes/NO:26993::\"No\"}; Visual change - {yes/NO:16171::\"No\"}    Respiratory: Cough - {yes/NO:67511::\"No\"}; SOB - {yes/NO:31469::\"No\"}; fever - {yes/NO:88574::\"No\"}     EXAM:  Vitals: See prenatal flowsheet   Abdomen: See prenatal flowsheet   Urine glucose/protein: See prenatal flowsheet   Pelvic: See prenatal flowsheet     Prenatal Labs  Lab Results   Component Value Date    HGB 13.0 10/14/2020    HEPBSAG NonReactive 2014    LABRPR Non-reactive 2014    ABORH A Rh Positive 10/04/2014    ABSCRN Negative 10/14/2020    LABANTI Negative 2014    [id number] 2014    URINECX 50,000 CFU/mL Mixed Keke Isolated 10/14/2020       MDM:  Impression: 1. {Pregnancy Imp:88463::\"Supervision of high risk pregnancy\"}   Tests done today: 1. {OB follow-up tests:30334::\"none\"}   Topics discussed: 1. Continue with PNV's  2. Prenatal labs reviewed  3. {Prenatal Checklist:87947::\"Questions answered regarding COVID-19 and revision of office schedule of visits due to pandemic\",\"No additional counseling given - she has no specific complaints or concerns\"}   Tests scheduled today for her next visit:   {OB tests next visit:56236::\"none\"}       "

## 2020-10-21 NOTE — PROGRESS NOTES
Subjective   Chief Complaint   Patient presents with   • Routine Prenatal Visit       Basia Schulte is a 30 y.o. year old .  Patient's last menstrual period was 2020 (approximate).  She presents because of having a miscarriage.  This week and she started to bleed heavily.  It turns out on  she underwent a suction D&C.  Since that time she has been doing fine.  Bleeding is much lighter.  She had an ultrasound today to make sure that there was no evidence of retained placental tissue.  Uncertain right now for her to try to conceive again but they are leaning towards trying to conceive in the future.  The only issue she is having is she still a bit sore.  She like to return to work but does not think she can lift for the next 7 days.    The following portions of the patient's history were reviewed and updated as appropriate:current medications and allergies    Social History    Tobacco Use      Smoking status: Former Smoker        Packs/day: 0.50        Years: 10.00        Pack years: 5        Types: Cigarettes        Start date:         Quit date: 2018        Years since quittin.7      Smokeless tobacco: Never Used         Objective   /70   Wt 69.9 kg (154 lb)   LMP 2020 (Approximate)   Breastfeeding Unknown   BMI 27.28 kg/m²     Lab Review   No data reviewed    Imaging   Pelvic ultrasound images independantly reviewed - Ultrasound shows a uterus with a small amount of fluid but otherwise no evidence of retained placental tissue        Assessment    1. Probable complete miscarriage status post suction D&C     Plan   1. Would wait at least for 1 menstrual cycle before trying to conceive  2. Continue with folic acid  3. The importance of keeping all planned follow-up and taking all medications as prescribed was emphasized.  4. Follow up PRN    5. Okay for work restrictions for 1 week time to limit lifting per patient request         This note was electronically  signed.    Wilver Rivera M.D.  October 21, 2020    Total time spent today with Basia  was 20 minutes (level 3).  Off this time, 80% was spent face-to-face time coordinating care, answering her questions and counseling regarding pathophysiology of her presenting problem along with plans for any diagnositc work-up and treatment.    Note: Speech recognition transcription software may have been used to create portions of this document.  An attempt at proofreading has been made but errors in transcription could still be present.

## 2020-10-31 NOTE — PROGRESS NOTES
Subjective   Basia Schulte is a 30 y.o. female who presents today for follow up    Chief Complaint:  ADHD, PTSD, Panic Attacks    This provider is located at [facility] at [address] [place] [address]. The provider identified himself as well as his credentials.   The Patient is at home using her phone because problems with video connection. The patient's condition being diagnosed/treated is appropriate for telemedicine. The patient gave consent to be seen remotely, and when consent is given they understand that the consent allows for patient identifiable information to be sent to a third party as needed.   They may refuse to be seen remotely at any time. The electronic data is encrypted and password protected, and the patient has been advised of the potential risks to privacy not withstanding such measures      History of Present Illness: Patient is a 30-year-old  female who presents today for follow-up.  Since last visit, patient had a miscarriage.  She is understandably grieving but is coping relatively well given the circumstances.  We discussed medication management should she become pregnant again and patient will likely discontinue all medications aside from Lamictal.  We could potentially discontinue Lamictal as she has not had a seizure since she was a teenager but the risks versus benefits must be weighed.  Patient had stopped her medication when she found out she was pregnant aside from Lamictal.  She denies any medication side effects.  She denies issues with sleep or appetite.  She denies SI/HI/AVH.    The following portions of the patient's history were reviewed and updated as appropriate: allergies, current medications, past family history, past medical history, past social history, past surgical history and problem list.      Past Medical History:  Past Medical History:   Diagnosis Date   • ADD (attention deficit disorder) 2016    Orignally Dx as anxiety as  kid   • COVID-19 2020   • History of kidney stones    • PID (pelvic inflammatory disease) 2016    Tx after seen in ER - STD w/u was (-)   • Seizure disorder (CMS/HCC)    • Vitamin D deficiency        Social History:  Social History     Socioeconomic History   • Marital status:      Spouse name: Not on file   • Number of children: Not on file   • Years of education: Not on file   • Highest education level: Not on file   Tobacco Use   • Smoking status: Former Smoker     Packs/day: 0.50     Years: 10.00     Pack years: 5.00     Types: Cigarettes     Start date:      Quit date: 2018     Years since quittin.7   • Smokeless tobacco: Never Used   Substance and Sexual Activity   • Alcohol use: No   • Drug use: No   • Sexual activity: Yes     Partners: Male     Birth control/protection: None   Social History Narrative    Caffeine:1-4  servings per day       Family History:  Family History   Problem Relation Age of Onset   • Breast cancer Paternal Grandmother    • Diabetes Paternal Grandmother    • Ovarian cancer Maternal Aunt    • Anxiety disorder Mother    • Depression Mother    • Anxiety disorder Father    • Depression Father    • Obesity Sister    • No Known Problems Brother    • Diabetes Maternal Grandmother    • Heart disease Maternal Grandmother    • Heart attack Maternal Grandmother    • Emphysema Maternal Grandfather    • No Known Problems Paternal Grandfather        Past Surgical History:  Past Surgical History:   Procedure Laterality Date   • CERVICAL CERCLAGE     • CERVICAL CERCLAGE  2014   • D&C WITH SUCTION     • D&C WITH SUCTION  2011   • D&C WITH SUCTION  10/18/2020   • EXTRACORPOREAL SHOCK WAVE LITHOTRIPSY (ESWL)     • PERINEAL LESION/CYST EXCISION N/A 2018    Surgeon: Jonh Davis MD;   [facility]; path = benign       Problem List:  Patient Active Problem List   Diagnosis   • Annual GYN exam w/o problems   • ADD (attention deficit disorder)   • Seizure  disorder (CMS/[facility])       Allergy:   Allergies   Allergen Reactions   • Latex Rash        Current Medications:   Current Outpatient Medications   Medication Sig Dispense Refill   • folic acid (FOLVITE) 1 MG tablet Take 1 mg by mouth Daily.  10   • hydrOXYzine (ATARAX) 10 MG tablet Take 1 tablet by mouth 2 (Two) Times a Day As Needed for Anxiety. 60 tablet 2   • lamoTRIgine (LaMICtal) 150 MG tablet Take 1 tablet by mouth 2 (Two) Times a Day. 60 tablet 2   • methylphenidate 36 MG CR tablet Take 1 tablet by mouth Daily 30 tablet 0   • Prenatal Vit-Fe Fumarate-FA (prenatal vitamin 28-0.8) 28-0.8 MG tablet tablet Take 1 tablet by mouth Daily. 30 tablet 0     No current facility-administered medications for this visit.        Review of Symptoms:    Review of Systems   Constitutional: Negative.    HENT: Negative.    Eyes: Negative.    Respiratory: Negative.    Cardiovascular: Positive for palpitations.   Gastrointestinal: Negative.    Endocrine: Negative.    Genitourinary: Negative.    Musculoskeletal: Negative.    Skin: Negative.    Allergic/Immunologic: Negative.    Neurological: Negative.    Hematological: Negative.    Psychiatric/Behavioral: Positive for dysphoric mood and stress.         Physical Exam:   Last menstrual period 08/06/2020, unknown if currently breastfeeding. Unable to assess, telephone visit.     Appearance: Unable to assess, telephone visit.   Gait, Station, Strength: Unable to assess, telephone visit.     Mental Status Exam:   Hygiene:   Unable to assess, telephone visit.   Cooperation:  Cooperative  Eye Contact:  Unable to assess, telephone visit.   Psychomotor Behavior:  Unable to assess, telephone visit.   Affect:  Full range  Mood: sad  Hopelessness: Denies  Speech:  Normal  Thought Process:  Goal directed and Linear  Thought Content:  Normal and Mood congruent  Suicidal:  None  Homicidal:  None  Hallucinations:  None  Delusion:  None  Memory:  Intact  Orientation:  Person, Place, Time and  Situation  Reliability:  good  Insight:  Good  Judgement:  Good  Impulse Control:  Good  Physical/Medical Issues:  Yes intermittent SVT and palpitations, recent miscarriage       Lab Results:   Admission on 10/14/2020, Discharged on 10/14/2020   Component Date Value Ref Range Status   • Glucose 10/14/2020 92  65 - 99 mg/dL Final   • BUN 10/14/2020 15  6 - 20 mg/dL Final   • Creatinine 10/14/2020 0.69  0.57 - 1.00 mg/dL Final   • Sodium 10/14/2020 138  136 - 145 mmol/L Final   • Potassium 10/14/2020 4.3  3.5 - 5.2 mmol/L Final    Slight hemolysis detected by analyzer. Results may be affected. 1+ Hemolysis    • Chloride 10/14/2020 104  98 - 107 mmol/L Final   • CO2 10/14/2020 23.2  22.0 - 29.0 mmol/L Final   • Calcium 10/14/2020 9.6  8.6 - 10.5 mg/dL Final   • Total Protein 10/14/2020 7.0  6.0 - 8.5 g/dL Final   • Albumin 10/14/2020 4.54  3.50 - 5.20 g/dL Final   • ALT (SGPT) 10/14/2020 33  1 - 33 U/L Final   • AST (SGOT) 10/14/2020 32  1 - 32 U/L Final   • Alkaline Phosphatase 10/14/2020 84  39 - 117 U/L Final   • Total Bilirubin 10/14/2020 0.2  0.0 - 1.2 mg/dL Final   • eGFR Non African Amer 10/14/2020 101  >60 mL/min/1.73 Final   • Globulin 10/14/2020 2.5  gm/dL Final   • A/G Ratio 10/14/2020 1.8  g/dL Final   • BUN/Creatinine Ratio 10/14/2020 21.7  7.0 - 25.0 Final   • Anion Gap 10/14/2020 10.8  5.0 - 15.0 mmol/L Final   • Lipase 10/14/2020 26  13 - 60 U/L Final   • HCG Qualitative 10/14/2020 Positive* Negative Final   • Color, UA 10/14/2020 Yellow  Yellow, Straw Final   • Appearance, UA 10/14/2020 Cloudy* Clear Final   • pH, UA 10/14/2020 8.0  5.0 - 8.0 Final   • Specific Gravity, UA 10/14/2020 1.014  1.005 - 1.030 Final   • Glucose, UA 10/14/2020 Negative  Negative Final   • Ketones, UA 10/14/2020 Negative  Negative Final   • Bilirubin, UA 10/14/2020 Negative  Negative Final   • Blood, UA 10/14/2020 Large (3+)* Negative Final   • Protein, UA 10/14/2020 Negative  Negative Final   • Leuk Esterase, UA 10/14/2020  Trace* Negative Final   • Nitrite, UA 10/14/2020 Negative  Negative Final   • Urobilinogen, UA 10/14/2020 0.2 E.U./dL  0.2 - 1.0 E.U./dL Final   • ABO Type 10/14/2020 A   Final   • RH type 10/14/2020 Positive   Final   • Antibody Screen 10/14/2020 Negative   Final   • Number of Doses 10/14/2020 RhIg is not indicated due to the patient's Rh status   Final   • KOH Prep 10/14/2020 No yeast or hyphal elements seen  No yeast or hyphal elements seen Final   • YEAST 10/14/2020 No yeast seen  No yeast seen Final   • HYPHAL ELEMENTS 10/14/2020 No Hyphal elements seen  No Hyphal elements seen Final   • WBC'S 10/14/2020 3+ WBC's seen* No WBC's seen Final   • Trichomonas, Wet Prep 10/14/2020 No Trichomonas seen  No Trichomonas seen Final   • WBC 10/14/2020 9.95  3.40 - 10.80 10*3/mm3 Final   • RBC 10/14/2020 4.17  3.77 - 5.28 10*6/mm3 Final   • Hemoglobin 10/14/2020 13.0  12.0 - 15.9 g/dL Final   • Hematocrit 10/14/2020 39.5  34.0 - 46.6 % Final   • MCV 10/14/2020 94.7  79.0 - 97.0 fL Final   • MCH 10/14/2020 31.2  26.6 - 33.0 pg Final   • MCHC 10/14/2020 32.9  31.5 - 35.7 g/dL Final   • RDW 10/14/2020 12.1* 12.3 - 15.4 % Final   • RDW-SD 10/14/2020 42.1  37.0 - 54.0 fl Final   • MPV 10/14/2020 10.8  6.0 - 12.0 fL Final   • Platelets 10/14/2020 270  140 - 450 10*3/mm3 Final   • Neutrophil % 10/14/2020 69.1  42.7 - 76.0 % Final   • Lymphocyte % 10/14/2020 22.7  19.6 - 45.3 % Final   • Monocyte % 10/14/2020 6.9  5.0 - 12.0 % Final   • Eosinophil % 10/14/2020 0.5  0.3 - 6.2 % Final   • Basophil % 10/14/2020 0.5  0.0 - 1.5 % Final   • Immature Grans % 10/14/2020 0.3  0.0 - 0.5 % Final   • Neutrophils, Absolute 10/14/2020 6.87  1.70 - 7.00 10*3/mm3 Final   • Lymphocytes, Absolute 10/14/2020 2.26  0.70 - 3.10 10*3/mm3 Final   • Monocytes, Absolute 10/14/2020 0.69  0.10 - 0.90 10*3/mm3 Final   • Eosinophils, Absolute 10/14/2020 0.05  0.00 - 0.40 10*3/mm3 Final   • Basophils, Absolute 10/14/2020 0.05  0.00 - 0.20 10*3/mm3 Final   •  Immature Grans, Absolute 10/14/2020 0.03  0.00 - 0.05 10*3/mm3 Final   • nRBC 10/14/2020 0.0  0.0 - 0.2 /100 WBC Final   • RBC, UA 10/14/2020 0-2  None Seen, 0-2 /HPF Final   • WBC, UA 10/14/2020 0-2  None Seen, 0-2 /HPF Final   • Bacteria, UA 10/14/2020 1+* None Seen /HPF Final   • Squamous Epithelial Cells, UA 10/14/2020 3-6* None Seen, 0-2 /HPF Final   • Hyaline Casts, UA 10/14/2020 0-2  None Seen /LPF Final   • Methodology 10/14/2020 Manual Light Microscopy   Final   • Urine Culture 10/14/2020 50,000 CFU/mL Mixed Keke Isolated   Final   • HCG Quantitative 10/14/2020 1,681.00  mIU/mL Final       Assessment/Plan   Diagnoses and all orders for this visit:    1. ADHD (attention deficit hyperactivity disorder), combined type (Primary)    2. Panic attack as reaction to stress    3. Post traumatic stress disorder (PTSD)    4. Initial insomnia    5. Grief associated with loss of fetus    -Patient recently had a miscarriage and is grieving from the loss of her fetus.  She had stopped her medications aside from Lamictal when she found out she was pregnant.  She has reinitiated medication after the miscarriage.  She is coping relatively well given the circumstances.  -Reviewed previous available documentation  -Reviewed most recent available labs  -DELBERT reviewed and appropriate. Patient counseled on use of controlled substances.   -Continue Concerta 36 mg p.o. daily for ADHD  -Discontinue Tenex  -Continue propranolol 10 mg up to twice daily as needed for anxiety or panic  -Continue hydroxyzine 10 mg 2 times daily as needed for anxiety  -Continue Lamictal 150 mg twice daily for history of seizure disorder and mood stabilization.  Patient has not had a seizure since she was a teenager  -Advised patient that should she again become pregnant to discontinue hydroxyzine and methylphenidate.  She could likely discontinue Lamictal but she has a history of seizure disorder.  She has not had a seizure since she was a teenager so  the risk versus benefits must be weighed.  -Encouraged to consider therapy  -Approximate appointment time 2:45 PM to 3 PM via telephone visit due to technical difficulty with video visit      Visit Diagnoses:    ICD-10-CM ICD-9-CM   1. ADHD (attention deficit hyperactivity disorder), combined type  F90.2 314.01   2. Panic attack as reaction to stress  F41.0 308.0    F43.0    3. Post traumatic stress disorder (PTSD)  F43.10 309.81   4. Initial insomnia  G47.09 780.52   5. Grief associated with loss of fetus  F43.21 309.0       TREATMENT PLAN/GOALS: Continue supportive psychotherapy efforts and medications as indicated. Treatment and medication options discussed during today's visit. Patient acknowledged and verbally consented to continue with current treatment plan and was educated on the importance of compliance with treatment and follow-up appointments.    MEDICATION ISSUES:    Discussed medication options and treatment plan of prescribed medication as well as the risks, benefits, and side effects including potential falls, possible impaired driving and metabolic adversities among others. Patient is agreeable to call the office with any worsening of symptoms or onset of side effects. Patient is agreeable to call 911 or go to the nearest ER should he/she begin having SI/HI.     MEDS ORDERED DURING VISIT:  No orders of the defined types were placed in this encounter.      Return in about 4 weeks (around 11/18/2020).             This document has been electronically signed by Elkin Pisnao MD  October 31, 2020 11:49 EDT

## 2020-12-02 DIAGNOSIS — F90.2 ADHD (ATTENTION DEFICIT HYPERACTIVITY DISORDER), COMBINED TYPE: ICD-10-CM

## 2020-12-03 RX ORDER — METHYLPHENIDATE HYDROCHLORIDE 27 MG/1
27 TABLET ORAL DAILY
Qty: 30 TABLET | Refills: 0 | Status: SHIPPED | OUTPATIENT
Start: 2020-12-03 | End: 2021-12-03

## 2020-12-17 ENCOUNTER — TELEPHONE (OUTPATIENT)
Dept: OBSTETRICS AND GYNECOLOGY | Facility: CLINIC | Age: 30
End: 2020-12-17

## 2022-01-16 ENCOUNTER — HOSPITAL ENCOUNTER (OUTPATIENT)
Dept: HOSPITAL 79 - GENOP | Age: 32
Discharge: HOME | End: 2022-01-16
Attending: OBSTETRICS & GYNECOLOGY
Payer: COMMERCIAL

## 2022-01-16 DIAGNOSIS — Z3A.37: ICD-10-CM

## 2022-01-16 DIAGNOSIS — O47.1: Primary | ICD-10-CM

## 2022-01-16 DIAGNOSIS — O99.333: ICD-10-CM

## 2022-01-16 DIAGNOSIS — F17.200: ICD-10-CM

## 2022-01-16 PROCEDURE — G0463 HOSPITAL OUTPT CLINIC VISIT: HCPCS

## 2024-12-16 NOTE — TELEPHONE ENCOUNTER
Please call and clarify with patient. I am assuming the person who took the message means she was prescribed Augmentin 875-125 which is the standard dose of of this medication and is a higher dose than plain amoxicillin 500. If the Augmentin was only written for once daily instead of twice daily I can change this, otherwise the Rx she was provided is appropriate.    RX request next apt 8/6/18

## (undated) DEVICE — SPNG GZ 2S 2X2 8PLY STRL PK/2

## (undated) DEVICE — SUT VIC 2/0 UR6 27IN VCP602H

## (undated) DEVICE — PK BASIC 70

## (undated) DEVICE — HOLDER: Brand: DEROYAL

## (undated) DEVICE — DRSNG PAD ABD 8X10IN STRL

## (undated) DEVICE — TRY SKINPREP PVP SCRB W PAINT

## (undated) DEVICE — SUT NLY 2/0 664G

## (undated) DEVICE — SUT ETHLN 3/0 PS2 18 IN 1669H

## (undated) DEVICE — DRAPE,LAPAROTOMY,PED,STERILE: Brand: MEDLINE